# Patient Record
Sex: MALE | Race: WHITE | NOT HISPANIC OR LATINO
[De-identification: names, ages, dates, MRNs, and addresses within clinical notes are randomized per-mention and may not be internally consistent; named-entity substitution may affect disease eponyms.]

---

## 2017-05-16 ENCOUNTER — RX RENEWAL (OUTPATIENT)
Age: 57
End: 2017-05-16

## 2017-06-15 ENCOUNTER — RX RENEWAL (OUTPATIENT)
Age: 57
End: 2017-06-15

## 2018-09-06 ENCOUNTER — APPOINTMENT (OUTPATIENT)
Dept: ENDOCRINOLOGY | Facility: CLINIC | Age: 58
End: 2018-09-06
Payer: MEDICAID

## 2018-09-06 VITALS
OXYGEN SATURATION: 98 % | SYSTOLIC BLOOD PRESSURE: 130 MMHG | HEART RATE: 66 BPM | HEIGHT: 69.69 IN | BODY MASS INDEX: 36.05 KG/M2 | WEIGHT: 249 LBS | DIASTOLIC BLOOD PRESSURE: 70 MMHG

## 2018-09-06 DIAGNOSIS — K21.9 GASTRO-ESOPHAGEAL REFLUX DISEASE W/OUT ESOPHAGITIS: ICD-10-CM

## 2018-09-06 DIAGNOSIS — F32.9 MAJOR DEPRESSIVE DISORDER, SINGLE EPISODE, UNSPECIFIED: ICD-10-CM

## 2018-09-06 DIAGNOSIS — R01.1 CARDIAC MURMUR, UNSPECIFIED: ICD-10-CM

## 2018-09-06 LAB — GLUCOSE BLDC GLUCOMTR-MCNC: 103

## 2018-09-06 PROCEDURE — 99205 OFFICE O/P NEW HI 60 MIN: CPT | Mod: 25

## 2018-09-06 PROCEDURE — 82962 GLUCOSE BLOOD TEST: CPT

## 2018-10-11 ENCOUNTER — APPOINTMENT (OUTPATIENT)
Dept: ENDOCRINOLOGY | Facility: CLINIC | Age: 58
End: 2018-10-11
Payer: MEDICAID

## 2018-10-11 VITALS
BODY MASS INDEX: 35.65 KG/M2 | DIASTOLIC BLOOD PRESSURE: 81 MMHG | HEART RATE: 62 BPM | HEIGHT: 70 IN | WEIGHT: 249 LBS | SYSTOLIC BLOOD PRESSURE: 142 MMHG | OXYGEN SATURATION: 96 %

## 2018-10-11 LAB
GLUCOSE BLDC GLUCOMTR-MCNC: 103
HBA1C MFR BLD HPLC: 5.7

## 2018-10-11 PROCEDURE — 99213 OFFICE O/P EST LOW 20 MIN: CPT | Mod: 25

## 2018-10-11 PROCEDURE — 82962 GLUCOSE BLOOD TEST: CPT

## 2018-10-11 PROCEDURE — 36415 COLL VENOUS BLD VENIPUNCTURE: CPT

## 2018-10-11 RX ORDER — LIRAGLUTIDE 6 MG/ML
18 INJECTION SUBCUTANEOUS
Qty: 1 | Refills: 0 | Status: DISCONTINUED | COMMUNITY
Start: 2018-09-06 | End: 2018-10-11

## 2018-10-12 LAB — SAVE SPECIMEN: NORMAL

## 2018-10-15 LAB
APPEARANCE: CLEAR
BILIRUBIN URINE: NEGATIVE
BLOOD URINE: NEGATIVE
COLOR: YELLOW
CREAT SPEC-SCNC: 28 MG/DL
GLUCOSE QUALITATIVE U: NEGATIVE MG/DL
KETONES URINE: NEGATIVE
LEUKOCYTE ESTERASE URINE: NEGATIVE
MICROALBUMIN 24H UR DL<=1MG/L-MCNC: 3.3 MG/DL
MICROALBUMIN/CREAT 24H UR-RTO: 118 MG/G
NITRITE URINE: NEGATIVE
PH URINE: 6.5
PROTEIN URINE: NEGATIVE MG/DL
SPECIFIC GRAVITY URINE: 1.01
T4 FREE SERPL-MCNC: 1.1 NG/DL
T4 SERPL-MCNC: 6.3 UG/DL
TSH SERPL-ACNC: 3.27 UIU/ML
UROBILINOGEN URINE: NEGATIVE MG/DL

## 2018-11-16 ENCOUNTER — APPOINTMENT (OUTPATIENT)
Dept: ENDOCRINOLOGY | Facility: CLINIC | Age: 58
End: 2018-11-16

## 2018-11-30 ENCOUNTER — APPOINTMENT (OUTPATIENT)
Dept: ENDOCRINOLOGY | Facility: CLINIC | Age: 58
End: 2018-11-30
Payer: MEDICAID

## 2018-11-30 VITALS
OXYGEN SATURATION: 98 % | BODY MASS INDEX: 34.15 KG/M2 | HEART RATE: 74 BPM | DIASTOLIC BLOOD PRESSURE: 80 MMHG | WEIGHT: 238 LBS | SYSTOLIC BLOOD PRESSURE: 127 MMHG

## 2018-11-30 LAB — GLUCOSE BLDC GLUCOMTR-MCNC: 129

## 2018-11-30 PROCEDURE — 99214 OFFICE O/P EST MOD 30 MIN: CPT | Mod: 25

## 2018-11-30 PROCEDURE — 82962 GLUCOSE BLOOD TEST: CPT

## 2018-11-30 RX ORDER — FLUTICASONE PROPIONATE 50 MCG
50 SPRAY, SUSPENSION NASAL
Refills: 0 | Status: DISCONTINUED | COMMUNITY
End: 2018-11-30

## 2018-12-01 LAB
BASOPHILS # BLD AUTO: 0.03 K/UL
BASOPHILS NFR BLD AUTO: 0.4 %
EOSINOPHIL # BLD AUTO: 0.23 K/UL
EOSINOPHIL NFR BLD AUTO: 3.2 %
HCT VFR BLD CALC: 47.3 %
HGB BLD-MCNC: 16.5 G/DL
IMM GRANULOCYTES NFR BLD AUTO: 0.1 %
LYMPHOCYTES # BLD AUTO: 1.6 K/UL
LYMPHOCYTES NFR BLD AUTO: 22 %
MAN DIFF?: NORMAL
MCHC RBC-ENTMCNC: 28.5 PG
MCHC RBC-ENTMCNC: 34.9 GM/DL
MCV RBC AUTO: 81.7 FL
MONOCYTES # BLD AUTO: 0.92 K/UL
MONOCYTES NFR BLD AUTO: 12.7 %
NEUTROPHILS # BLD AUTO: 4.47 K/UL
NEUTROPHILS NFR BLD AUTO: 61.6 %
PLATELET # BLD AUTO: 264 K/UL
RBC # BLD: 5.79 M/UL
RBC # FLD: 17.1 %
WBC # FLD AUTO: 7.26 K/UL

## 2019-01-07 ENCOUNTER — RX RENEWAL (OUTPATIENT)
Age: 59
End: 2019-01-07

## 2019-01-15 ENCOUNTER — APPOINTMENT (OUTPATIENT)
Dept: ENDOCRINOLOGY | Facility: CLINIC | Age: 59
End: 2019-01-15
Payer: MEDICAID

## 2019-01-15 VITALS
HEART RATE: 58 BPM | DIASTOLIC BLOOD PRESSURE: 82 MMHG | OXYGEN SATURATION: 100 % | HEIGHT: 70 IN | WEIGHT: 252.25 LBS | SYSTOLIC BLOOD PRESSURE: 143 MMHG | BODY MASS INDEX: 36.11 KG/M2

## 2019-01-15 LAB
GLUCOSE BLDC GLUCOMTR-MCNC: 112
HBA1C MFR BLD HPLC: 5.5

## 2019-01-15 PROCEDURE — 99214 OFFICE O/P EST MOD 30 MIN: CPT | Mod: 25

## 2019-01-15 PROCEDURE — 83036 HEMOGLOBIN GLYCOSYLATED A1C: CPT | Mod: QW

## 2019-01-15 PROCEDURE — 82962 GLUCOSE BLOOD TEST: CPT

## 2019-01-15 RX ORDER — CHROMIUM 200 MCG
800 TABLET ORAL
Refills: 0 | Status: ACTIVE | COMMUNITY

## 2019-01-15 NOTE — ASSESSMENT
[FreeTextEntry1] : The patient was well controlled type 2 diabetes, his current A1c is 5.5. We did discuss about compliance with his medications and for now he will remain on Trulicity 0.75 ( although he was asking for an increase in his dose). Since he is back on aspirin because of the TIA he should discuss this with his gastroenterologist and be followed closely for any bleeding. [Carbohydrate Consistent Diet] : carbohydrate consistent diet [Diabetes Foot Care] : diabetes foot care [Importance of Diet and Exercise] : importance of diet and exercise to improve glycemic control, achieve weight loss and improve cardiovascular health [Self Monitoring of Blood Glucose] : self monitoring of blood glucose [Incretin Mimetic Therapy] : Risks and benefits of incretin mimetic therapy were discussed with the patient including nauseau, pancreatitis and potential risk of medullary thyroid cancer

## 2019-01-15 NOTE — PHYSICAL EXAM
[Alert] : alert [No Acute Distress] : no acute distress [Well Nourished] : well nourished [Well Developed] : well developed [Normal Sclera/Conjunctiva] : normal sclera/conjunctiva [EOMI] : extra ocular movement intact [No Proptosis] : no proptosis [Normal Oropharynx] : the oropharynx was normal [Thyroid Not Enlarged] : the thyroid was not enlarged [No Thyroid Nodules] : there were no palpable thyroid nodules [No Respiratory Distress] : no respiratory distress [No Accessory Muscle Use] : no accessory muscle use [Clear to Auscultation] : lungs were clear to auscultation bilaterally [Normal Rate] : heart rate was normal  [Normal S1, S2] : normal S1 and S2 [Regular Rhythm] : with a regular rhythm [Pedal Pulses Normal] : the pedal pulses are present [No Edema] : there was no peripheral edema [Normal Bowel Sounds] : normal bowel sounds [Not Tender] : non-tender [Soft] : abdomen soft [Not Distended] : not distended [No Spinal Tenderness] : no spinal tenderness [Spine Straight] : spine straight [Normal Gait] : normal gait [Normal Strength/Tone] : muscle strength and tone were normal [No Rash] : no rash [Acanthosis Nigricans] : no acanthosis nigricans [Normal Reflexes] : deep tendon reflexes were 2+ and symmetric [No Tremors] : no tremors [Oriented x3] : oriented to person, place, and time

## 2019-01-15 NOTE — HISTORY OF PRESENT ILLNESS
[FreeTextEntry1] : Patient with a history of type 2 diabetes for many years. Prior to bariatric surgery he used to be on insulin, after that he had lost about 100 pounds and was maintained on oral agents. Currently he is on metformin 2000 mg a day and Trulcity 0.75 weekly. She has missed a few doses of this however. Since his last visit he has gained some weight. He recently was told that he had a TIA and a few months ago he had a significant GI bleed. He is concerned about resuming aspirin. he has a h/o of hypothyroidism for many years; recent sonogram was normal.

## 2019-04-03 ENCOUNTER — RX RENEWAL (OUTPATIENT)
Age: 59
End: 2019-04-03

## 2019-04-16 ENCOUNTER — APPOINTMENT (OUTPATIENT)
Dept: ENDOCRINOLOGY | Facility: CLINIC | Age: 59
End: 2019-04-16

## 2019-07-16 ENCOUNTER — MEDICATION RENEWAL (OUTPATIENT)
Age: 59
End: 2019-07-16

## 2019-08-12 ENCOUNTER — RX RENEWAL (OUTPATIENT)
Age: 59
End: 2019-08-12

## 2019-08-27 ENCOUNTER — MEDICATION RENEWAL (OUTPATIENT)
Age: 59
End: 2019-08-27

## 2019-09-20 ENCOUNTER — MEDICATION RENEWAL (OUTPATIENT)
Age: 59
End: 2019-09-20

## 2019-09-20 ENCOUNTER — APPOINTMENT (OUTPATIENT)
Dept: ENDOCRINOLOGY | Facility: CLINIC | Age: 59
End: 2019-09-20
Payer: MEDICAID

## 2019-09-20 VITALS — SYSTOLIC BLOOD PRESSURE: 160 MMHG | HEART RATE: 65 BPM | DIASTOLIC BLOOD PRESSURE: 83 MMHG

## 2019-09-20 VITALS
DIASTOLIC BLOOD PRESSURE: 101 MMHG | HEIGHT: 70 IN | HEART RATE: 67 BPM | BODY MASS INDEX: 35.24 KG/M2 | SYSTOLIC BLOOD PRESSURE: 152 MMHG | WEIGHT: 246.13 LBS | OXYGEN SATURATION: 99 %

## 2019-09-20 LAB
GLUCOSE BLDC GLUCOMTR-MCNC: 113
HBA1C MFR BLD HPLC: 5.7

## 2019-09-20 PROCEDURE — 99214 OFFICE O/P EST MOD 30 MIN: CPT | Mod: 25

## 2019-09-20 PROCEDURE — 36415 COLL VENOUS BLD VENIPUNCTURE: CPT

## 2019-09-20 PROCEDURE — 83036 HEMOGLOBIN GLYCOSYLATED A1C: CPT | Mod: QW

## 2019-09-20 PROCEDURE — G0447 BEHAVIOR COUNSEL OBESITY 15M: CPT | Mod: 59

## 2019-09-20 PROCEDURE — 82962 GLUCOSE BLOOD TEST: CPT

## 2019-09-20 RX ORDER — CEPHALEXIN 500 MG/1
500 CAPSULE ORAL
Refills: 0 | Status: DISCONTINUED | COMMUNITY
End: 2019-09-20

## 2019-09-20 RX ORDER — CHLORHEXIDINE GLUCONATE 4 %
325 (65 FE) LIQUID (ML) TOPICAL
Refills: 0 | Status: DISCONTINUED | COMMUNITY
End: 2019-09-20

## 2019-09-20 RX ORDER — MUPIROCIN 20 MG/G
2 OINTMENT TOPICAL
Qty: 1 | Refills: 0 | Status: ACTIVE | COMMUNITY
Start: 2019-09-20 | End: 1900-01-01

## 2019-09-20 NOTE — HISTORY OF PRESENT ILLNESS
[FreeTextEntry1] : Patient with a long standing history of diabetes associated with obesity. He used to be on insulin as well. In 2001, he had gastric bypass surgery, and was able to remain off of the insulin. Since then he has only been taking Metformin 1000 mg BID. He did also take Byetta in the past after gastric bypass to help with controlling his appetite. He stopped taking it because he felt he no longer needed it. Presently he is on Trulicity 0.75 mg. He has lost 6lbs since his last visit 6 months ago. He is requesting to increase his dose. \par He also has a long standing history of hypothyroidism. Since he has been taking the medication the correct way, his TSH was wnl. \par He is requesting we check a CBC for him, as he has a history of anemia and is worried that it may have dropped down. He is not able to get to his PCP at this time. Denies any symptoms of anemia at this time. \par His BP is somewhat elevated today. He feels well and denies any chest pain, palpitations or shortness of breath. He does admit he had a lot of salty foods yesterday and today.

## 2019-09-20 NOTE — ASSESSMENT
[FreeTextEntry1] : Patient with well controlled type 2 diabetes associated with obesity. At this time, he will continue taking his medications as instructed. We discussed the importance of lifestyle modification in addition to the medication. I have encouraged him to cut down on the amount of diet snapple he drinks (currently drinking 1 gallon daily). We discussed incorporating exercise into his ADLs. \par Thyoid sono stable. Labs done to check TFTs as well. \par He will drink lots of water and contact his PCP for further instruction. \par  [Carbohydrate Consistent Diet] : carbohydrate consistent diet [Hypoglycemia Management] : hypoglycemia management [Diabetes Foot Care] : diabetes foot care [Long Term Vascular Complications] : long term vascular complications of diabetes [Importance of Diet and Exercise] : importance of diet and exercise to improve glycemic control, achieve weight loss and improve cardiovascular health [Self Monitoring of Blood Glucose] : self monitoring of blood glucose [Injection Technique, Storage, Sharps Disposal] : injection technique, storage, and sharps disposal [Levothyroxine] : The patient was instructed to take Levothyroxine on an empty stomach, separate from vitamins, and wait at least 30 minutes before eating [Retinopathy Screening] : Patient was referred to ophthalmology for retinopathy screening [Incretin Mimetic Therapy] : Risks and benefits of incretin mimetic therapy were discussed with the patient including nauseau, pancreatitis and potential risk of medullary thyroid cancer

## 2019-09-20 NOTE — PHYSICAL EXAM
[Alert] : alert [No Acute Distress] : no acute distress [Well Nourished] : well nourished [Well Developed] : well developed [Normal Sclera/Conjunctiva] : normal sclera/conjunctiva [EOMI] : extra ocular movement intact [No Proptosis] : no proptosis [Normal Oropharynx] : the oropharynx was normal [Thyroid Not Enlarged] : the thyroid was not enlarged [No Thyroid Nodules] : there were no palpable thyroid nodules [No Accessory Muscle Use] : no accessory muscle use [No Respiratory Distress] : no respiratory distress [Clear to Auscultation] : lungs were clear to auscultation bilaterally [Normal S1, S2] : normal S1 and S2 [Normal Rate] : heart rate was normal  [Regular Rhythm] : with a regular rhythm [Pedal Pulses Normal] : the pedal pulses are present [No Edema] : there was no peripheral edema [Normal Bowel Sounds] : normal bowel sounds [Soft] : abdomen soft [Not Tender] : non-tender [Spine Straight] : spine straight [Not Distended] : not distended [No Spinal Tenderness] : no spinal tenderness [No Stigmata of Cushings Syndrome] : no stigmata of cushings syndrome [Normal Gait] : normal gait [Normal Strength/Tone] : muscle strength and tone were normal [No Rash] : no rash [Normal Reflexes] : deep tendon reflexes were 2+ and symmetric [Acanthosis Nigricans] : no acanthosis nigricans [No Tremors] : no tremors [Oriented x3] : oriented to person, place, and time

## 2019-09-23 LAB
ALBUMIN SERPL ELPH-MCNC: 4.5 G/DL
ALP BLD-CCNC: 74 U/L
ALT SERPL-CCNC: 26 U/L
ANION GAP SERPL CALC-SCNC: 13 MMOL/L
APPEARANCE: CLEAR
AST SERPL-CCNC: 26 U/L
BASOPHILS # BLD AUTO: 0.04 K/UL
BASOPHILS NFR BLD AUTO: 0.6 %
BILIRUB SERPL-MCNC: 0.6 MG/DL
BILIRUBIN URINE: NEGATIVE
BLOOD URINE: NEGATIVE
BUN SERPL-MCNC: 10 MG/DL
CALCIUM SERPL-MCNC: 9.5 MG/DL
CHLORIDE SERPL-SCNC: 98 MMOL/L
CHOLEST SERPL-MCNC: 115 MG/DL
CHOLEST/HDLC SERPL: 1.9 RATIO
CO2 SERPL-SCNC: 23 MMOL/L
COLOR: YELLOW
CREAT SERPL-MCNC: 0.76 MG/DL
CREAT SPEC-SCNC: 24 MG/DL
EOSINOPHIL # BLD AUTO: 0.28 K/UL
EOSINOPHIL NFR BLD AUTO: 4 %
FERRITIN SERPL-MCNC: 17 NG/ML
FOLATE SERPL-MCNC: >20 NG/ML
GLUCOSE QUALITATIVE U: NEGATIVE
GLUCOSE SERPL-MCNC: 105 MG/DL
HCT VFR BLD CALC: 46 %
HDLC SERPL-MCNC: 62 MG/DL
HGB BLD-MCNC: 15.4 G/DL
IMM GRANULOCYTES NFR BLD AUTO: 0.1 %
IRON SATN MFR SERPL: 20 %
IRON SERPL-MCNC: 90 UG/DL
KETONES URINE: NEGATIVE
LDLC SERPL CALC-MCNC: 40 MG/DL
LEUKOCYTE ESTERASE URINE: NEGATIVE
LYMPHOCYTES # BLD AUTO: 1.86 K/UL
LYMPHOCYTES NFR BLD AUTO: 26.9 %
MAN DIFF?: NORMAL
MCHC RBC-ENTMCNC: 30.3 PG
MCHC RBC-ENTMCNC: 33.5 GM/DL
MCV RBC AUTO: 90.4 FL
MICROALBUMIN 24H UR DL<=1MG/L-MCNC: 7.3 MG/DL
MICROALBUMIN/CREAT 24H UR-RTO: 302 MG/G
MONOCYTES # BLD AUTO: 0.72 K/UL
MONOCYTES NFR BLD AUTO: 10.4 %
NEUTROPHILS # BLD AUTO: 4.01 K/UL
NEUTROPHILS NFR BLD AUTO: 58 %
NITRITE URINE: NEGATIVE
PH URINE: 7.5
PLATELET # BLD AUTO: 214 K/UL
POTASSIUM SERPL-SCNC: 4.4 MMOL/L
PROT SERPL-MCNC: 6.9 G/DL
PROTEIN URINE: NORMAL
RBC # BLD: 5.09 M/UL
RBC # FLD: 14.8 %
SAVE SPECIMEN: NORMAL
SODIUM SERPL-SCNC: 134 MMOL/L
SPECIFIC GRAVITY URINE: 1.01
T4 FREE SERPL-MCNC: 1.2 NG/DL
T4 SERPL-MCNC: 6.9 UG/DL
TIBC SERPL-MCNC: 446 UG/DL
TRIGL SERPL-MCNC: 67 MG/DL
TSH SERPL-ACNC: 4.41 UIU/ML
UIBC SERPL-MCNC: 356 UG/DL
UROBILINOGEN URINE: NORMAL
VIT B12 SERPL-MCNC: 707 PG/ML
WBC # FLD AUTO: 6.92 K/UL

## 2019-11-11 ENCOUNTER — APPOINTMENT (OUTPATIENT)
Dept: HEART AND VASCULAR | Facility: CLINIC | Age: 59
End: 2019-11-11

## 2020-01-07 ENCOUNTER — APPOINTMENT (OUTPATIENT)
Dept: ENDOCRINOLOGY | Facility: CLINIC | Age: 60
End: 2020-01-07
Payer: MEDICAID

## 2020-01-07 VITALS
BODY MASS INDEX: 34.65 KG/M2 | OXYGEN SATURATION: 100 % | HEART RATE: 55 BPM | WEIGHT: 242 LBS | SYSTOLIC BLOOD PRESSURE: 113 MMHG | HEIGHT: 70 IN | DIASTOLIC BLOOD PRESSURE: 62 MMHG

## 2020-01-07 LAB
GLUCOSE BLDC GLUCOMTR-MCNC: 155
HBA1C MFR BLD HPLC: 5.4

## 2020-01-07 PROCEDURE — 83036 HEMOGLOBIN GLYCOSYLATED A1C: CPT | Mod: QW

## 2020-01-07 PROCEDURE — 82962 GLUCOSE BLOOD TEST: CPT

## 2020-01-07 PROCEDURE — 99214 OFFICE O/P EST MOD 30 MIN: CPT | Mod: 25

## 2020-01-07 PROCEDURE — G0447 BEHAVIOR COUNSEL OBESITY 15M: CPT | Mod: 59

## 2020-01-07 NOTE — HISTORY OF PRESENT ILLNESS
[FreeTextEntry1] : Patient with a long standing history of diabetes associated with obesity. He used to be on insulin as well. In 2001, he had gastric bypass surgery, and was able to remain off of the insulin. Since then he has only been taking Metformin 1000 mg BID. He did also take Byetta in the past after gastric bypass to help with controlling his appetite. He stopped taking it because he felt he no longer needed it. Presently he is on Trulicity 0.75 mg and Metformin 1000 mg BID. He has lost 4lbs since his last visit 4 months ago. \par He also has a long standing history of hypothyroidism. Last visit tsh was mildly elevated. Pt was taking it with snapple. He has since been taking it correctly. A1C is 5.4, down from 5.7.\par His BP is much improved. He s/w his cardiologist per my recommendation, and his BP medication was adjusted. He feels well on this.

## 2020-01-07 NOTE — PHYSICAL EXAM
[No Acute Distress] : no acute distress [Alert] : alert [Well Developed] : well developed [Well Nourished] : well nourished [Normal Sclera/Conjunctiva] : normal sclera/conjunctiva [EOMI] : extra ocular movement intact [No Proptosis] : no proptosis [Normal Oropharynx] : the oropharynx was normal [Thyroid Not Enlarged] : the thyroid was not enlarged [No Thyroid Nodules] : there were no palpable thyroid nodules [No Respiratory Distress] : no respiratory distress [No Accessory Muscle Use] : no accessory muscle use [Clear to Auscultation] : lungs were clear to auscultation bilaterally [Normal Rate] : heart rate was normal  [Normal S1, S2] : normal S1 and S2 [Regular Rhythm] : with a regular rhythm [Pedal Pulses Normal] : the pedal pulses are present [No Edema] : there was no peripheral edema [Normal Bowel Sounds] : normal bowel sounds [Not Tender] : non-tender [Soft] : abdomen soft [Not Distended] : not distended [No Spinal Tenderness] : no spinal tenderness [Spine Straight] : spine straight [No Stigmata of Cushings Syndrome] : no stigmata of cushings syndrome [Normal Gait] : normal gait [Normal Strength/Tone] : muscle strength and tone were normal [No Rash] : no rash [Normal Reflexes] : deep tendon reflexes were 2+ and symmetric [No Tremors] : no tremors [Oriented x3] : oriented to person, place, and time [Acanthosis Nigricans] : no acanthosis nigricans

## 2020-01-14 LAB
ALBUMIN SERPL ELPH-MCNC: 4.8 G/DL
ALP BLD-CCNC: 72 U/L
ALT SERPL-CCNC: 24 U/L
ANION GAP SERPL CALC-SCNC: 11 MMOL/L
APPEARANCE: CLEAR
AST SERPL-CCNC: 28 U/L
BASOPHILS # BLD AUTO: 0.07 K/UL
BASOPHILS NFR BLD AUTO: 1 %
BILIRUB SERPL-MCNC: 0.7 MG/DL
BILIRUBIN URINE: NEGATIVE
BLOOD URINE: NEGATIVE
BUN SERPL-MCNC: 10 MG/DL
CALCIUM SERPL-MCNC: 10 MG/DL
CHLORIDE SERPL-SCNC: 90 MMOL/L
CHOLEST SERPL-MCNC: 121 MG/DL
CHOLEST/HDLC SERPL: 1.8 RATIO
CO2 SERPL-SCNC: 27 MMOL/L
COLOR: NORMAL
CREAT SERPL-MCNC: 0.93 MG/DL
CREAT SPEC-SCNC: 32 MG/DL
EOSINOPHIL # BLD AUTO: 0.35 K/UL
EOSINOPHIL NFR BLD AUTO: 4.8 %
GLUCOSE QUALITATIVE U: NEGATIVE
GLUCOSE SERPL-MCNC: 102 MG/DL
HCT VFR BLD CALC: 43.7 %
HDLC SERPL-MCNC: 67 MG/DL
HGB BLD-MCNC: 15.3 G/DL
IMM GRANULOCYTES NFR BLD AUTO: 0.1 %
KETONES URINE: NEGATIVE
LDLC SERPL CALC-MCNC: 38 MG/DL
LEUKOCYTE ESTERASE URINE: NEGATIVE
LYMPHOCYTES # BLD AUTO: 2.23 K/UL
LYMPHOCYTES NFR BLD AUTO: 30.3 %
MAN DIFF?: NORMAL
MCHC RBC-ENTMCNC: 31 PG
MCHC RBC-ENTMCNC: 35 GM/DL
MCV RBC AUTO: 88.5 FL
MICROALBUMIN 24H UR DL<=1MG/L-MCNC: 4.1 MG/DL
MICROALBUMIN/CREAT 24H UR-RTO: 127 MG/G
MONOCYTES # BLD AUTO: 0.94 K/UL
MONOCYTES NFR BLD AUTO: 12.8 %
NEUTROPHILS # BLD AUTO: 3.75 K/UL
NEUTROPHILS NFR BLD AUTO: 51 %
NITRITE URINE: NEGATIVE
PH URINE: 7.5
PLATELET # BLD AUTO: 253 K/UL
POTASSIUM SERPL-SCNC: 5.3 MMOL/L
PROT SERPL-MCNC: 7.1 G/DL
PROTEIN URINE: NEGATIVE
RBC # BLD: 4.94 M/UL
RBC # FLD: 15 %
SODIUM SERPL-SCNC: 128 MMOL/L
SPECIFIC GRAVITY URINE: 1.01
T4 FREE SERPL-MCNC: 1.3 NG/DL
T4 SERPL-MCNC: 7.5 UG/DL
TRIGL SERPL-MCNC: 79 MG/DL
TSH SERPL-ACNC: 6.24 UIU/ML
UROBILINOGEN URINE: NORMAL
WBC # FLD AUTO: 7.35 K/UL

## 2020-02-20 LAB
ALBUMIN SERPL ELPH-MCNC: 4.9 G/DL
ALP BLD-CCNC: 68 U/L
ALT SERPL-CCNC: 21 U/L
ANION GAP SERPL CALC-SCNC: 14 MMOL/L
APPEARANCE: CLEAR
AST SERPL-CCNC: 29 U/L
BACTERIA: NEGATIVE
BILIRUB SERPL-MCNC: 0.7 MG/DL
BILIRUBIN URINE: NEGATIVE
BLOOD URINE: NEGATIVE
BUN SERPL-MCNC: 13 MG/DL
CALCIUM SERPL-MCNC: 9.7 MG/DL
CHLORIDE SERPL-SCNC: 95 MMOL/L
CO2 SERPL-SCNC: 22 MMOL/L
COLOR: YELLOW
CREAT SERPL-MCNC: 0.85 MG/DL
GLUCOSE QUALITATIVE U: NEGATIVE
GLUCOSE SERPL-MCNC: 95 MG/DL
HYALINE CASTS: 2 /LPF
KETONES URINE: NEGATIVE
LEUKOCYTE ESTERASE URINE: NEGATIVE
MICROSCOPIC-UA: NORMAL
NITRITE URINE: NEGATIVE
OSMOLALITY SERPL: 273 MOSMOL/KG
OSMOLALITY UR: 324 MOSM/KG
PH URINE: 8
POTASSIUM SERPL-SCNC: 5 MMOL/L
PROT SERPL-MCNC: 7.1 G/DL
PROTEIN URINE: NORMAL
RED BLOOD CELLS URINE: 4 /HPF
SODIUM SERPL-SCNC: 131 MMOL/L
SPECIFIC GRAVITY URINE: 1.01
SQUAMOUS EPITHELIAL CELLS: 0 /HPF
T4 FREE SERPL-MCNC: 1.3 NG/DL
T4 SERPL-MCNC: 7.4 UG/DL
TSH SERPL-ACNC: 3.47 UIU/ML
UROBILINOGEN URINE: NORMAL
WHITE BLOOD CELLS URINE: 1 /HPF

## 2020-02-25 ENCOUNTER — NON-APPOINTMENT (OUTPATIENT)
Age: 60
End: 2020-02-25

## 2020-04-07 ENCOUNTER — LABORATORY RESULT (OUTPATIENT)
Age: 60
End: 2020-04-07

## 2020-04-08 LAB
25(OH)D3 SERPL-MCNC: 43.8 NG/ML
ALBUMIN SERPL ELPH-MCNC: 4.7 G/DL
ALP BLD-CCNC: 77 U/L
ALT SERPL-CCNC: 20 U/L
ANION GAP SERPL CALC-SCNC: 14 MMOL/L
APPEARANCE: CLEAR
AST SERPL-CCNC: 30 U/L
BASOPHILS # BLD AUTO: 0.09 K/UL
BASOPHILS NFR BLD AUTO: 1.2 %
BILIRUB SERPL-MCNC: 0.9 MG/DL
BILIRUBIN URINE: NEGATIVE
BLOOD URINE: NEGATIVE
BUN SERPL-MCNC: 5 MG/DL
CALCIUM SERPL-MCNC: 9.5 MG/DL
CHLORIDE SERPL-SCNC: 89 MMOL/L
CHOLEST SERPL-MCNC: 125 MG/DL
CHOLEST/HDLC SERPL: 1.9 RATIO
CO2 SERPL-SCNC: 24 MMOL/L
COLOR: NORMAL
CREAT SERPL-MCNC: 0.8 MG/DL
CREAT SPEC-SCNC: 45 MG/DL
EOSINOPHIL # BLD AUTO: 0.38 K/UL
EOSINOPHIL NFR BLD AUTO: 5.1 %
ESTIMATED AVERAGE GLUCOSE: 108 MG/DL
FOLATE SERPL-MCNC: >20 NG/ML
GLUCOSE QUALITATIVE U: NEGATIVE
GLUCOSE SERPL-MCNC: 89 MG/DL
HBA1C MFR BLD HPLC: 5.4 %
HCT VFR BLD CALC: 46.1 %
HDLC SERPL-MCNC: 67 MG/DL
HGB BLD-MCNC: 15.2 G/DL
IMM GRANULOCYTES NFR BLD AUTO: 0.1 %
KETONES URINE: NEGATIVE
LDLC SERPL CALC-MCNC: 41 MG/DL
LEUKOCYTE ESTERASE URINE: NEGATIVE
LYMPHOCYTES # BLD AUTO: 1.77 K/UL
LYMPHOCYTES NFR BLD AUTO: 23.7 %
MAN DIFF?: NORMAL
MCHC RBC-ENTMCNC: 28.7 PG
MCHC RBC-ENTMCNC: 33 GM/DL
MCV RBC AUTO: 87 FL
MICROALBUMIN 24H UR DL<=1MG/L-MCNC: 14.9 MG/DL
MICROALBUMIN/CREAT 24H UR-RTO: 329 MG/G
MONOCYTES # BLD AUTO: 0.84 K/UL
MONOCYTES NFR BLD AUTO: 11.2 %
NEUTROPHILS # BLD AUTO: 4.39 K/UL
NEUTROPHILS NFR BLD AUTO: 58.7 %
NITRITE URINE: NEGATIVE
PH URINE: 8.5
PLATELET # BLD AUTO: 263 K/UL
POTASSIUM SERPL-SCNC: 4.5 MMOL/L
PROT SERPL-MCNC: 6.9 G/DL
PROTEIN URINE: ABNORMAL
RBC # BLD: 5.3 M/UL
RBC # FLD: 13.9 %
SAVE SPECIMEN: NORMAL
SODIUM SERPL-SCNC: 127 MMOL/L
SPECIFIC GRAVITY URINE: 1.01
T4 FREE SERPL-MCNC: 1.4 NG/DL
T4 SERPL-MCNC: 8.1 UG/DL
TRIGL SERPL-MCNC: 81 MG/DL
TSH SERPL-ACNC: 4.08 UIU/ML
UROBILINOGEN URINE: NORMAL
VIT B12 SERPL-MCNC: 1071 PG/ML
WBC # FLD AUTO: 7.48 K/UL

## 2020-04-15 LAB
ALBUMIN SERPL ELPH-MCNC: 4.9 G/DL
ALP BLD-CCNC: 86 U/L
ALT SERPL-CCNC: 26 U/L
ANION GAP SERPL CALC-SCNC: 11 MMOL/L
AST SERPL-CCNC: 25 U/L
BILIRUB SERPL-MCNC: 0.6 MG/DL
BUN SERPL-MCNC: 14 MG/DL
CALCIUM SERPL-MCNC: 9.9 MG/DL
CHLORIDE SERPL-SCNC: 94 MMOL/L
CO2 SERPL-SCNC: 27 MMOL/L
CORTIS SERPL-MCNC: 10.3 UG/DL
CREAT SERPL-MCNC: 0.89 MG/DL
GLUCOSE SERPL-MCNC: 98 MG/DL
OSMOLALITY SERPL: 282 MOSMOL/KG
OSMOLALITY UR: 290 MOSM/KG
POTASSIUM SERPL-SCNC: 5 MMOL/L
PROLACTIN SERPL-MCNC: 14.3 NG/ML
PROT SERPL-MCNC: 7.5 G/DL
SAVE SPECIMEN: NORMAL
SODIUM ?TM SUB UR QN: 32 MMOL/L
SODIUM SERPL-SCNC: 133 MMOL/L
TSH SERPL-ACNC: 4.21 UIU/ML

## 2020-04-16 ENCOUNTER — NON-APPOINTMENT (OUTPATIENT)
Age: 60
End: 2020-04-16

## 2020-04-17 LAB — CHLORIDE ?TM UR-SCNC: 31 MMOL/L

## 2020-04-27 RX ORDER — BLOOD SUGAR DIAGNOSTIC
STRIP MISCELLANEOUS 3 TIMES DAILY
Qty: 1 | Refills: 5 | Status: ACTIVE | COMMUNITY
Start: 2018-09-06 | End: 1900-01-01

## 2020-06-22 ENCOUNTER — RX RENEWAL (OUTPATIENT)
Age: 60
End: 2020-06-22

## 2020-07-06 ENCOUNTER — APPOINTMENT (OUTPATIENT)
Dept: ENDOCRINOLOGY | Facility: CLINIC | Age: 60
End: 2020-07-06
Payer: MEDICAID

## 2020-07-06 VITALS
WEIGHT: 255.25 LBS | SYSTOLIC BLOOD PRESSURE: 145 MMHG | HEIGHT: 70 IN | DIASTOLIC BLOOD PRESSURE: 69 MMHG | HEART RATE: 55 BPM | BODY MASS INDEX: 36.54 KG/M2 | OXYGEN SATURATION: 95 %

## 2020-07-06 LAB
GLUCOSE BLDC GLUCOMTR-MCNC: 94
HBA1C MFR BLD HPLC: 5.7

## 2020-07-06 PROCEDURE — 99214 OFFICE O/P EST MOD 30 MIN: CPT | Mod: 25

## 2020-07-06 PROCEDURE — 83036 HEMOGLOBIN GLYCOSYLATED A1C: CPT | Mod: QW

## 2020-07-06 PROCEDURE — 36415 COLL VENOUS BLD VENIPUNCTURE: CPT

## 2020-07-06 PROCEDURE — 82962 GLUCOSE BLOOD TEST: CPT

## 2020-07-06 RX ORDER — IBUPROFEN 600 MG/1
600 TABLET, FILM COATED ORAL
Qty: 30 | Refills: 0 | Status: ACTIVE | COMMUNITY
Start: 2020-06-11

## 2020-07-06 RX ORDER — AZITHROMYCIN 500 MG/1
500 TABLET, FILM COATED ORAL
Qty: 3 | Refills: 0 | Status: ACTIVE | COMMUNITY
Start: 2020-04-03

## 2020-07-06 RX ORDER — AMLODIPINE BESYLATE 2.5 MG/1
2.5 TABLET ORAL
Qty: 90 | Refills: 0 | Status: ACTIVE | COMMUNITY
Start: 2020-03-23

## 2020-07-06 RX ORDER — ESCITALOPRAM OXALATE 10 MG/1
10 TABLET ORAL
Qty: 90 | Refills: 0 | Status: ACTIVE | COMMUNITY
Start: 2020-04-27

## 2020-07-06 RX ORDER — ACETAMINOPHEN AND CODEINE 300; 30 MG/1; MG/1
300-30 TABLET ORAL
Qty: 10 | Refills: 0 | Status: ACTIVE | COMMUNITY
Start: 2020-06-29

## 2020-07-06 RX ORDER — AZITHROMYCIN 250 MG/1
250 TABLET, FILM COATED ORAL
Qty: 6 | Refills: 0 | Status: ACTIVE | COMMUNITY
Start: 2020-05-26

## 2020-07-06 RX ORDER — LOSARTAN POTASSIUM 50 MG/1
50 TABLET, FILM COATED ORAL DAILY
Refills: 0 | Status: ACTIVE | COMMUNITY

## 2020-07-06 RX ORDER — HYDROXYCHLOROQUINE SULFATE 200 MG/1
200 TABLET, FILM COATED ORAL
Qty: 28 | Refills: 0 | Status: ACTIVE | COMMUNITY
Start: 2020-04-03

## 2020-07-06 RX ORDER — LABETALOL HYDROCHLORIDE 200 MG/1
200 TABLET, FILM COATED ORAL
Qty: 180 | Refills: 0 | Status: ACTIVE | COMMUNITY
Start: 2020-04-27

## 2020-07-06 RX ORDER — CLONAZEPAM 1 MG/1
1 TABLET ORAL
Qty: 90 | Refills: 0 | Status: ACTIVE | COMMUNITY
Start: 2020-06-29

## 2020-07-06 RX ORDER — PANTOPRAZOLE 40 MG/1
40 TABLET, DELAYED RELEASE ORAL
Refills: 0 | Status: DISCONTINUED | COMMUNITY
End: 2020-07-06

## 2020-07-06 RX ORDER — AMOXICILLIN 875 MG/1
875 TABLET, FILM COATED ORAL
Qty: 20 | Refills: 0 | Status: ACTIVE | COMMUNITY
Start: 2020-06-11

## 2020-07-06 RX ORDER — NEBIVOLOL HYDROCHLORIDE 20 MG/1
20 TABLET ORAL DAILY
Refills: 0 | Status: ACTIVE | COMMUNITY

## 2020-07-06 RX ORDER — BETAMETHASONE DIPROPIONATE 0.5 MG/G
0.05 OINTMENT TOPICAL
Qty: 15 | Refills: 0 | Status: ACTIVE | COMMUNITY
Start: 2019-12-02

## 2020-07-06 NOTE — PHYSICAL EXAM
[Alert] : alert [Well Nourished] : well nourished [No Acute Distress] : no acute distress [Well Developed] : well developed [EOMI] : extra ocular movement intact [Normal Sclera/Conjunctiva] : normal sclera/conjunctiva [No Proptosis] : no proptosis [Thyroid Not Enlarged] : the thyroid was not enlarged [Normal Oropharynx] : the oropharynx was normal [No Thyroid Nodules] : no palpable thyroid nodules [No Respiratory Distress] : no respiratory distress [No Accessory Muscle Use] : no accessory muscle use [Clear to Auscultation] : lungs were clear to auscultation bilaterally [Normal Rate] : heart rate was normal [Normal S1, S2] : normal S1 and S2 [Regular Rhythm] : with a regular rhythm [Pedal Pulses Normal] : the pedal pulses are present [No Edema] : no peripheral edema [Normal Bowel Sounds] : normal bowel sounds [Not Tender] : non-tender [Not Distended] : not distended [Soft] : abdomen soft [No Stigmata of Cushings Syndrome] : no stigmata of Cushings Syndrome [Normal Gait] : normal gait [No Rash] : no rash [Normal Strength/Tone] : muscle strength and tone were normal [No Sensory Deficits] : the sensory exam was normal to light touch and pinprick [Normal Reflexes] : deep tendon reflexes were 2+ and symmetric [No Tremors] : no tremors [Normal Sensation on Monofilament Testing] : normal sensation on monofilament testing of lower extremities [Oriented x3] : oriented to person, place, and time [Acanthosis Nigricans] : no acanthosis nigricans

## 2020-07-06 NOTE — HISTORY OF PRESENT ILLNESS
[FreeTextEntry1] : Patient with a history of type 2 diabetes for many years. Prior to bariatric surgery he used to be on insulin, after that he had lost about 100 pounds and was maintained on oral agents. Currently he is on metformin 2000 mg a day and Trulicity 0.75 weekly.  He has had a enedina gain and would like to increase to 1.5 mg dose.  . Since his last visit he has gained some weight. He recently was told that he had a TIA and a few months ago he had a significant GI bleed. He is concerned about resuming aspirin. he has a h/o of hypothyroidism for many years; thyroid  sonogram was normal. He drink a large amount of fluid daily and at times sodium levels have been in 120's.

## 2020-07-06 NOTE — ASSESSMENT
[FreeTextEntry1] : The patient was well controlled type 2 diabetes, his current A1c is 5.7. We did discuss about compliance with his medications; due to weight gain can increase Trulicity  to 1.5 mg weekly. Since he is back on aspirin because of the TIA he should discuss this with his gastroenterologist and be followed closely for any bleeding.Labs done today to also assess sodium level. Told to decrease his intake of fluids by one snapple bottle a day.  [Long Term Vascular Complications] : long term vascular complications of diabetes [Importance of Diet and Exercise] : importance of diet and exercise to improve glycemic control, achieve weight loss and improve cardiovascular health [Carbohydrate Consistent Diet] : carbohydrate consistent diet [Weight Loss] : weight loss [Self Monitoring of Blood Glucose] : self monitoring of blood glucose [Retinopathy Screening] : Patient was referred to ophthalmology for retinopathy screening [Diabetic Medications] : Risks and benefits of diabetic medications were discussed

## 2020-07-08 LAB
25(OH)D3 SERPL-MCNC: 42 NG/ML
ALBUMIN SERPL ELPH-MCNC: 4.8 G/DL
ALP BLD-CCNC: 86 U/L
ALT SERPL-CCNC: 25 U/L
ANION GAP SERPL CALC-SCNC: 14 MMOL/L
APPEARANCE: CLEAR
AST SERPL-CCNC: 26 U/L
BASOPHILS # BLD AUTO: 0.06 K/UL
BASOPHILS NFR BLD AUTO: 0.8 %
BILIRUB SERPL-MCNC: 0.5 MG/DL
BILIRUBIN URINE: NEGATIVE
BLOOD URINE: NEGATIVE
BUN SERPL-MCNC: 9 MG/DL
CALCIUM SERPL-MCNC: 9.1 MG/DL
CHLORIDE SERPL-SCNC: 93 MMOL/L
CHOLEST SERPL-MCNC: 112 MG/DL
CHOLEST/HDLC SERPL: 2 RATIO
CO2 SERPL-SCNC: 23 MMOL/L
COLOR: NORMAL
CREAT SERPL-MCNC: 0.8 MG/DL
CREAT SPEC-SCNC: 32 MG/DL
EOSINOPHIL # BLD AUTO: 0.29 K/UL
EOSINOPHIL NFR BLD AUTO: 4 %
GLUCOSE QUALITATIVE U: NEGATIVE
GLUCOSE SERPL-MCNC: 88 MG/DL
HCT VFR BLD CALC: 43.1 %
HDLC SERPL-MCNC: 57 MG/DL
HGB BLD-MCNC: 14.1 G/DL
IMM GRANULOCYTES NFR BLD AUTO: 0.1 %
KETONES URINE: NEGATIVE
LDLC SERPL CALC-MCNC: 39 MG/DL
LEUKOCYTE ESTERASE URINE: NEGATIVE
LYMPHOCYTES # BLD AUTO: 1.91 K/UL
LYMPHOCYTES NFR BLD AUTO: 26.6 %
MAN DIFF?: NORMAL
MCHC RBC-ENTMCNC: 27.2 PG
MCHC RBC-ENTMCNC: 32.7 GM/DL
MCV RBC AUTO: 83 FL
MICROALBUMIN 24H UR DL<=1MG/L-MCNC: 5.8 MG/DL
MICROALBUMIN/CREAT 24H UR-RTO: 181 MG/G
MONOCYTES # BLD AUTO: 0.8 K/UL
MONOCYTES NFR BLD AUTO: 11.2 %
NEUTROPHILS # BLD AUTO: 4.1 K/UL
NEUTROPHILS NFR BLD AUTO: 57.3 %
NITRITE URINE: NEGATIVE
OSMOLALITY SERPL: 267 MOSMOL/KG
OSMOLALITY UR: 281 MOSM/KG
PH URINE: 8
PLATELET # BLD AUTO: 243 K/UL
POTASSIUM SERPL-SCNC: 5 MMOL/L
PROT SERPL-MCNC: 7.1 G/DL
PROTEIN URINE: NORMAL
RBC # BLD: 5.19 M/UL
RBC # FLD: 15 %
SAVE SPECIMEN: NORMAL
SODIUM ?TM SUB UR QN: 30 MMOL/L
SODIUM SERPL-SCNC: 129 MMOL/L
SPECIFIC GRAVITY URINE: 1.01
T4 FREE SERPL-MCNC: 1.4 NG/DL
T4 SERPL-MCNC: 7 UG/DL
TRIGL SERPL-MCNC: 80 MG/DL
TSH SERPL-ACNC: 5.58 UIU/ML
UROBILINOGEN URINE: NORMAL
WBC # FLD AUTO: 7.17 K/UL

## 2020-07-16 LAB
SAVE SPECIMEN: NORMAL
SODIUM SERPL-SCNC: 134 MMOL/L

## 2020-08-17 ENCOUNTER — RX RENEWAL (OUTPATIENT)
Age: 60
End: 2020-08-17

## 2020-08-26 ENCOUNTER — APPOINTMENT (OUTPATIENT)
Dept: ENDOCRINOLOGY | Facility: CLINIC | Age: 60
End: 2020-08-26
Payer: MEDICAID

## 2020-08-26 VITALS
HEART RATE: 52 BPM | WEIGHT: 244 LBS | OXYGEN SATURATION: 98 % | DIASTOLIC BLOOD PRESSURE: 75 MMHG | BODY MASS INDEX: 34.93 KG/M2 | HEIGHT: 70 IN | SYSTOLIC BLOOD PRESSURE: 153 MMHG

## 2020-08-26 LAB — GLUCOSE BLDC GLUCOMTR-MCNC: 89

## 2020-08-26 PROCEDURE — 36415 COLL VENOUS BLD VENIPUNCTURE: CPT

## 2020-08-26 PROCEDURE — 82962 GLUCOSE BLOOD TEST: CPT

## 2020-08-26 PROCEDURE — 99213 OFFICE O/P EST LOW 20 MIN: CPT | Mod: 25

## 2020-08-26 RX ORDER — AMLODIPINE BESYLATE 5 MG/1
5 TABLET ORAL DAILY
Refills: 0 | Status: ACTIVE | COMMUNITY
Start: 2020-01-23

## 2020-08-26 NOTE — PHYSICAL EXAM
[Well Nourished] : well nourished [Alert] : alert [Well Developed] : well developed [No Acute Distress] : no acute distress [EOMI] : extra ocular movement intact [Normal Sclera/Conjunctiva] : normal sclera/conjunctiva [Normal Oropharynx] : the oropharynx was normal [No Proptosis] : no proptosis [Thyroid Not Enlarged] : the thyroid was not enlarged [No Thyroid Nodules] : no palpable thyroid nodules [No Respiratory Distress] : no respiratory distress [Clear to Auscultation] : lungs were clear to auscultation bilaterally [No Accessory Muscle Use] : no accessory muscle use [Normal S1, S2] : normal S1 and S2 [Regular Rhythm] : with a regular rhythm [Normal Rate] : heart rate was normal [No Edema] : no peripheral edema [Pedal Pulses Normal] : the pedal pulses are present [Not Distended] : not distended [Not Tender] : non-tender [Normal Bowel Sounds] : normal bowel sounds [Soft] : abdomen soft [Normal Anterior Cervical Nodes] : no anterior cervical lymphadenopathy [Normal Posterior Cervical Nodes] : no posterior cervical lymphadenopathy [No Spinal Tenderness] : no spinal tenderness [Spine Straight] : spine straight [Normal Gait] : normal gait [Normal Strength/Tone] : muscle strength and tone were normal [No Rash] : no rash [Acanthosis Nigricans] : no acanthosis nigricans [Normal Reflexes] : deep tendon reflexes were 2+ and symmetric [No Tremors] : no tremors [Oriented x3] : oriented to person, place, and time

## 2020-08-26 NOTE — HISTORY OF PRESENT ILLNESS
[FreeTextEntry1] : Patient with a long standing history of diabetes associated with obesity. He used to be on insulin as well. In 2001, he had gastric bypass surgery, and was able to remain off of the insulin. Since then he has only been taking Metformin 1000 mg BID. He did also take Byetta in the past after gastric bypass to help with controlling his appetite. He stopped taking it because he felt he no longer needed it. Presently he is on Trulicity 1.5 mg (increased at his last visit) and Metformin 1000 mg BID. He has lost 11lbs since his last visit 4 months ago. \par He also has a long standing history of hypothyroidism. Last visit tsh was mildly elevated. Pt was taking it with snapple. He has since been taking it correctly. A1C is 5.4, down from 5.7.\par His BP is much improved. He s/w his cardiologist per my recommendation, and his BP medication was adjusted. He feels well on this. \par He has cut down his fluid intake.

## 2020-08-26 NOTE — ASSESSMENT
[FreeTextEntry1] : Patient with well controlled type 2 diabetes associated with obesity. He will continue to focus on weight loss. I did stress the importance of cutting down his snapple intake further. I explained to him that even though it is diet snapple not only is it impacting his sodium but also his insulin resistance. He will continue his medications. \par We discussed ways he can incorporate exercise into his daily routine. \par Thyoid sono stable 12/2018. Will continue to monitor TFTs\par \par Labs done to check for sodium level.\par \par f/u in 2 months\par  [Long Term Vascular Complications] : long term vascular complications of diabetes [Carbohydrate Consistent Diet] : carbohydrate consistent diet [Exercise/Effect on Glucose] : exercise/effect on glucose [Importance of Diet and Exercise] : importance of diet and exercise to improve glycemic control, achieve weight loss and improve cardiovascular health [Self Monitoring of Blood Glucose] : self monitoring of blood glucose [Injection Technique, Storage, Sharps Disposal] : injection technique, storage, and sharps disposal [Retinopathy Screening] : Patient was referred to ophthalmology for retinopathy screening [Weight Loss] : weight loss [Diabetic Medications] : Risks and benefits of diabetic medications were discussed

## 2020-08-27 LAB
ALBUMIN SERPL ELPH-MCNC: 4.8 G/DL
ALP BLD-CCNC: 86 U/L
ALT SERPL-CCNC: 30 U/L
ANION GAP SERPL CALC-SCNC: 15 MMOL/L
AST SERPL-CCNC: 30 U/L
BILIRUB SERPL-MCNC: 0.6 MG/DL
BUN SERPL-MCNC: 10 MG/DL
CALCIUM SERPL-MCNC: 9.8 MG/DL
CHLORIDE SERPL-SCNC: 93 MMOL/L
CO2 SERPL-SCNC: 22 MMOL/L
CREAT SERPL-MCNC: 0.8 MG/DL
GLUCOSE SERPL-MCNC: 82 MG/DL
POTASSIUM SERPL-SCNC: 4.8 MMOL/L
PROT SERPL-MCNC: 6.9 G/DL
SAVE SPECIMEN: NORMAL
SODIUM SERPL-SCNC: 130 MMOL/L

## 2020-09-17 ENCOUNTER — RX RENEWAL (OUTPATIENT)
Age: 60
End: 2020-09-17

## 2020-10-13 ENCOUNTER — APPOINTMENT (OUTPATIENT)
Dept: ENDOCRINOLOGY | Facility: CLINIC | Age: 60
End: 2020-10-13
Payer: MEDICAID

## 2020-10-13 VITALS
HEART RATE: 49 BPM | SYSTOLIC BLOOD PRESSURE: 150 MMHG | HEIGHT: 70 IN | BODY MASS INDEX: 34.72 KG/M2 | DIASTOLIC BLOOD PRESSURE: 78 MMHG | OXYGEN SATURATION: 100 % | WEIGHT: 242.5 LBS

## 2020-10-13 DIAGNOSIS — Z20.828 CONTACT WITH AND (SUSPECTED) EXPOSURE TO OTHER VIRAL COMMUNICABLE DISEASES: ICD-10-CM

## 2020-10-13 LAB
GLUCOSE BLDC GLUCOMTR-MCNC: 82
HBA1C MFR BLD HPLC: 5.6

## 2020-10-13 PROCEDURE — 82962 GLUCOSE BLOOD TEST: CPT

## 2020-10-13 PROCEDURE — 83036 HEMOGLOBIN GLYCOSYLATED A1C: CPT | Mod: QW

## 2020-10-13 PROCEDURE — 99213 OFFICE O/P EST LOW 20 MIN: CPT | Mod: 25

## 2020-10-13 NOTE — PHYSICAL EXAM
[Alert] : alert [Well Nourished] : well nourished [No Acute Distress] : no acute distress [Well Developed] : well developed [Normal Sclera/Conjunctiva] : normal sclera/conjunctiva [EOMI] : extra ocular movement intact [No Proptosis] : no proptosis [Normal Oropharynx] : the oropharynx was normal [Thyroid Not Enlarged] : the thyroid was not enlarged [No Thyroid Nodules] : no palpable thyroid nodules [No Respiratory Distress] : no respiratory distress [No Accessory Muscle Use] : no accessory muscle use [Clear to Auscultation] : lungs were clear to auscultation bilaterally [Normal S1, S2] : normal S1 and S2 [Normal Rate] : heart rate was normal [Regular Rhythm] : with a regular rhythm [No Edema] : no peripheral edema [Pedal Pulses Normal] : the pedal pulses are present [Normal Bowel Sounds] : normal bowel sounds [Not Tender] : non-tender [Not Distended] : not distended [Soft] : abdomen soft [No Stigmata of Cushings Syndrome] : no stigmata of Cushings Syndrome [Normal Gait] : normal gait [Normal Strength/Tone] : muscle strength and tone were normal [No Rash] : no rash [Acanthosis Nigricans] : no acanthosis nigricans [No Sensory Deficits] : the sensory exam was normal to light touch and pinprick [Normal Reflexes] : deep tendon reflexes were 2+ and symmetric [No Tremors] : no tremors [Normal Sensation on Monofilament Testing] : normal sensation on monofilament testing of lower extremities [Oriented x3] : oriented to person, place, and time

## 2020-10-13 NOTE — HISTORY OF PRESENT ILLNESS
[FreeTextEntry1] : Patient with a history of type 2 diabetes for many years. Prior to bariatric surgery he used to be on insulin, after that he had lost about 100 pounds and was maintained on oral agents. Currently he is on metformin 2000 mg a day and Trulicity 1.5 mg weekly.  He has had lost some weight.  He recently was told that he had a TIA and a few months ago he had a significant GI bleed. He is concerned about resuming aspirin. he has a h/o of hypothyroidism for many years; thyroid  sonogram was normal. He drink a large amount of fluid daily and at times sodium levels have been in 120's.

## 2020-10-13 NOTE — ASSESSMENT
[FreeTextEntry1] : The patient was well controlled type 2 diabetes, his current A1c is 5.6. We did discuss about compliance with his medications; continue Trulicity  to 1.5 mg weekly. Since he is back on aspirin because of the TIA he should discuss this with his gastroenterologist and be followed closely for any bleeding.Labs to be done tomorrow fasting  to also assess sodium level. Told to decrease his intake of fluids by one snapple bottle a day. He did notice some CP when walking plans to f/u with his cardiologist. Will check TFTs he has been stable on levothyroxine 75 ug.  [Diabetes Foot Care] : diabetes foot care [Long Term Vascular Complications] : long term vascular complications of diabetes [Carbohydrate Consistent Diet] : carbohydrate consistent diet [Importance of Diet and Exercise] : importance of diet and exercise to improve glycemic control, achieve weight loss and improve cardiovascular health [Self Monitoring of Blood Glucose] : self monitoring of blood glucose [Retinopathy Screening] : Patient was referred to ophthalmology for retinopathy screening [Diabetic Medications] : Risks and benefits of diabetic medications were discussed [Levothyroxine] : The patient was instructed to take Levothyroxine on an empty stomach, separate from vitamins, and wait at least 30 minutes before eating

## 2020-10-13 NOTE — REASON FOR VISIT
[Follow - Up] : a follow-up visit [DM Type 2] : DM Type 2 [Hypothyroidism] : hypothyroidism [Weight Management/Obesity] : weight management/obesity [Other___] : [unfilled]

## 2020-10-15 LAB
25(OH)D3 SERPL-MCNC: 49.1 NG/ML
ALBUMIN SERPL ELPH-MCNC: 4.9 G/DL
ALP BLD-CCNC: 99 U/L
ALT SERPL-CCNC: 31 U/L
ANION GAP SERPL CALC-SCNC: 11 MMOL/L
APPEARANCE: CLEAR
AST SERPL-CCNC: 28 U/L
BASOPHILS # BLD AUTO: 0.06 K/UL
BASOPHILS NFR BLD AUTO: 0.9 %
BILIRUB SERPL-MCNC: 0.5 MG/DL
BILIRUBIN URINE: NEGATIVE
BLOOD URINE: NEGATIVE
BUN SERPL-MCNC: 10 MG/DL
CALCIUM SERPL-MCNC: 9.9 MG/DL
CHLORIDE SERPL-SCNC: 96 MMOL/L
CHOLEST SERPL-MCNC: 123 MG/DL
CHOLEST/HDLC SERPL: 1.8 RATIO
CO2 SERPL-SCNC: 29 MMOL/L
COLOR: NORMAL
CREAT SERPL-MCNC: 0.89 MG/DL
CREAT SPEC-SCNC: 65 MG/DL
EOSINOPHIL # BLD AUTO: 0.29 K/UL
EOSINOPHIL NFR BLD AUTO: 4.4 %
FERRITIN SERPL-MCNC: 14 NG/ML
FOLATE SERPL-MCNC: >20 NG/ML
GLUCOSE QUALITATIVE U: NEGATIVE
GLUCOSE SERPL-MCNC: 97 MG/DL
HCT VFR BLD CALC: 48.4 %
HDLC SERPL-MCNC: 68 MG/DL
HGB BLD-MCNC: 15.5 G/DL
IMM GRANULOCYTES NFR BLD AUTO: 0.3 %
IRON SATN MFR SERPL: 7 %
IRON SERPL-MCNC: 38 UG/DL
KETONES URINE: NEGATIVE
LDLC SERPL CALC-MCNC: 40 MG/DL
LEUKOCYTE ESTERASE URINE: NEGATIVE
LYMPHOCYTES # BLD AUTO: 1.64 K/UL
LYMPHOCYTES NFR BLD AUTO: 24.7 %
MAN DIFF?: NORMAL
MCHC RBC-ENTMCNC: 27.1 PG
MCHC RBC-ENTMCNC: 32 GM/DL
MCV RBC AUTO: 84.8 FL
MICROALBUMIN 24H UR DL<=1MG/L-MCNC: 6.4 MG/DL
MICROALBUMIN/CREAT 24H UR-RTO: 98 MG/G
MONOCYTES # BLD AUTO: 0.75 K/UL
MONOCYTES NFR BLD AUTO: 11.3 %
NEUTROPHILS # BLD AUTO: 3.89 K/UL
NEUTROPHILS NFR BLD AUTO: 58.4 %
NITRITE URINE: NEGATIVE
OSMOLALITY SERPL: 286 MOSMOL/KG
OSMOLALITY UR: 362 MOSM/KG
PH URINE: 8.5
PLATELET # BLD AUTO: 271 K/UL
POTASSIUM SERPL-SCNC: 4.7 MMOL/L
PROT SERPL-MCNC: 7.6 G/DL
PROTEIN URINE: NORMAL
RBC # BLD: 5.71 M/UL
RBC # FLD: 16.4 %
SARS-COV-2 IGG SERPL IA-ACNC: <0.1 INDEX
SARS-COV-2 IGG SERPL QL IA: NEGATIVE
SAVE SPECIMEN: NORMAL
SODIUM SERPL-SCNC: 136 MMOL/L
SPECIFIC GRAVITY URINE: 1.01
T4 FREE SERPL-MCNC: 1.2 NG/DL
T4 SERPL-MCNC: 7.3 UG/DL
TIBC SERPL-MCNC: 515 UG/DL
TRIGL SERPL-MCNC: 76 MG/DL
TSH SERPL-ACNC: 4.59 UIU/ML
UIBC SERPL-MCNC: 477 UG/DL
UROBILINOGEN URINE: NORMAL
VIT B12 SERPL-MCNC: 848 PG/ML
WBC # FLD AUTO: 6.65 K/UL

## 2020-10-22 ENCOUNTER — NON-APPOINTMENT (OUTPATIENT)
Age: 60
End: 2020-10-22

## 2020-10-22 LAB — VASOPRESSIN SERPL-MCNC: <0.8 PG/ML

## 2020-10-28 ENCOUNTER — APPOINTMENT (OUTPATIENT)
Dept: ENDOCRINOLOGY | Facility: CLINIC | Age: 60
End: 2020-10-28

## 2020-11-23 ENCOUNTER — RX RENEWAL (OUTPATIENT)
Age: 60
End: 2020-11-23

## 2020-12-07 ENCOUNTER — RX RENEWAL (OUTPATIENT)
Age: 60
End: 2020-12-07

## 2021-01-07 ENCOUNTER — APPOINTMENT (OUTPATIENT)
Dept: ENDOCRINOLOGY | Facility: CLINIC | Age: 61
End: 2021-01-07
Payer: MEDICAID

## 2021-01-07 VITALS
HEART RATE: 57 BPM | TEMPERATURE: 97.5 F | SYSTOLIC BLOOD PRESSURE: 134 MMHG | BODY MASS INDEX: 34.93 KG/M2 | HEIGHT: 70 IN | WEIGHT: 244 LBS | DIASTOLIC BLOOD PRESSURE: 74 MMHG | OXYGEN SATURATION: 97 %

## 2021-01-07 LAB
GLUCOSE BLDC GLUCOMTR-MCNC: 105
HBA1C MFR BLD HPLC: 5.6

## 2021-01-07 PROCEDURE — 82962 GLUCOSE BLOOD TEST: CPT

## 2021-01-07 PROCEDURE — 83036 HEMOGLOBIN GLYCOSYLATED A1C: CPT | Mod: QW

## 2021-01-07 PROCEDURE — 99213 OFFICE O/P EST LOW 20 MIN: CPT | Mod: 25

## 2021-01-07 PROCEDURE — 99072 ADDL SUPL MATRL&STAF TM PHE: CPT

## 2021-01-07 PROCEDURE — 36415 COLL VENOUS BLD VENIPUNCTURE: CPT

## 2021-01-07 NOTE — HISTORY OF PRESENT ILLNESS
[FreeTextEntry1] : Patient with a history of type 2 diabetes for many years. Prior to bariatric surgery he used to be on insulin, after that he had lost about 100 pounds and was maintained on oral agents. Currently he is on metformin 2000 mg a day and Trulicity 1.5 mg weekly.  He has had lost some weight.  He recently was told that he had a TIA and a few months ago he had a significant GI bleed. He is concerned about resuming aspirin. he has a h/o of hypothyroidism for many years; thyroid  sonogram was normal. He drink a large amount of fluid daily and at times sodium levels have been in 120's. After cutting down liquid intake it improved.

## 2021-01-07 NOTE — ASSESSMENT
[FreeTextEntry1] : The patient was well controlled type 2 diabetes, his current A1c is 5.6. We did discuss about compliance with his medications; continue Trulicity  to 1.5 mg weekly. Since he is back on aspirin because of the TIA he should discuss this with his gastroenterologist and be followed closely for any bleeding.Labs done today to assess sodium level. Told to decrease his intake of fluids by one snapple bottle a day. He did notice some CP when walking plans to f/u with his cardiologist. Will check TFTs he has been stable on levothyroxine 75 ug.  [Carbohydrate Consistent Diet] : carbohydrate consistent diet [Importance of Diet and Exercise] : importance of diet and exercise to improve glycemic control, achieve weight loss and improve cardiovascular health [Weight Loss] : weight loss [Levothyroxine] : The patient was instructed to take Levothyroxine on an empty stomach, separate from vitamins, and wait at least 30 minutes before eating

## 2021-01-19 ENCOUNTER — RX RENEWAL (OUTPATIENT)
Age: 61
End: 2021-01-19

## 2021-02-25 LAB
25(OH)D3 SERPL-MCNC: 35.1 NG/ML
ALBUMIN SERPL ELPH-MCNC: 4.6 G/DL
ALP BLD-CCNC: 91 U/L
ALT SERPL-CCNC: 30 U/L
ANION GAP SERPL CALC-SCNC: 17 MMOL/L
AST SERPL-CCNC: 31 U/L
BASOPHILS # BLD AUTO: 0.06 K/UL
BASOPHILS NFR BLD AUTO: 0.8 %
BILIRUB SERPL-MCNC: 0.4 MG/DL
BUN SERPL-MCNC: 11 MG/DL
CALCIUM SERPL-MCNC: 9.5 MG/DL
CHLORIDE SERPL-SCNC: 97 MMOL/L
CO2 SERPL-SCNC: 17 MMOL/L
CREAT SERPL-MCNC: 0.81 MG/DL
EOSINOPHIL # BLD AUTO: 0.29 K/UL
EOSINOPHIL NFR BLD AUTO: 3.9 %
FERRITIN SERPL-MCNC: 24 NG/ML
FOLATE SERPL-MCNC: 19.8 NG/ML
GLUCOSE SERPL-MCNC: 84 MG/DL
HCT VFR BLD CALC: 40.3 %
HGB BLD-MCNC: 12.8 G/DL
IMM GRANULOCYTES NFR BLD AUTO: 0.3 %
IRON SATN MFR SERPL: 6 %
IRON SERPL-MCNC: 28 UG/DL
LYMPHOCYTES # BLD AUTO: 1.78 K/UL
LYMPHOCYTES NFR BLD AUTO: 24.1 %
MAN DIFF?: NORMAL
MCHC RBC-ENTMCNC: 25.7 PG
MCHC RBC-ENTMCNC: 31.8 GM/DL
MCV RBC AUTO: 80.9 FL
MONOCYTES # BLD AUTO: 0.96 K/UL
MONOCYTES NFR BLD AUTO: 13 %
NEUTROPHILS # BLD AUTO: 4.27 K/UL
NEUTROPHILS NFR BLD AUTO: 57.9 %
PLATELET # BLD AUTO: 278 K/UL
POTASSIUM SERPL-SCNC: 5.1 MMOL/L
PROT SERPL-MCNC: 6.5 G/DL
RBC # BLD: 4.98 M/UL
RBC # FLD: 15.3 %
SAVE SPECIMEN: NORMAL
SODIUM SERPL-SCNC: 131 MMOL/L
T4 FREE SERPL-MCNC: 1 NG/DL
T4 SERPL-MCNC: 10.1 UG/DL
TIBC SERPL-MCNC: 458 UG/DL
TSH SERPL-ACNC: 1.19 UIU/ML
UIBC SERPL-MCNC: 429 UG/DL
VIT B12 SERPL-MCNC: 689 PG/ML
WBC # FLD AUTO: 7.38 K/UL

## 2021-03-11 ENCOUNTER — RX RENEWAL (OUTPATIENT)
Age: 61
End: 2021-03-11

## 2021-04-19 ENCOUNTER — APPOINTMENT (OUTPATIENT)
Dept: ENDOCRINOLOGY | Facility: CLINIC | Age: 61
End: 2021-04-19
Payer: MEDICAID

## 2021-04-19 VITALS
TEMPERATURE: 97.5 F | HEIGHT: 70 IN | DIASTOLIC BLOOD PRESSURE: 75 MMHG | BODY MASS INDEX: 33.21 KG/M2 | OXYGEN SATURATION: 97 % | SYSTOLIC BLOOD PRESSURE: 125 MMHG | HEART RATE: 57 BPM | WEIGHT: 232 LBS

## 2021-04-19 LAB
GLUCOSE BLDC GLUCOMTR-MCNC: 117
HBA1C MFR BLD HPLC: 5.7

## 2021-04-19 PROCEDURE — 83036 HEMOGLOBIN GLYCOSYLATED A1C: CPT | Mod: QW

## 2021-04-19 PROCEDURE — 36415 COLL VENOUS BLD VENIPUNCTURE: CPT

## 2021-04-19 PROCEDURE — 99214 OFFICE O/P EST MOD 30 MIN: CPT | Mod: 25

## 2021-04-19 PROCEDURE — 99072 ADDL SUPL MATRL&STAF TM PHE: CPT

## 2021-04-19 PROCEDURE — 82962 GLUCOSE BLOOD TEST: CPT

## 2021-04-19 RX ORDER — BETAMETHASONE DIPROPIONATE 0.5 MG/G
0.05 CREAM TOPICAL
Qty: 15 | Refills: 0 | Status: ACTIVE | COMMUNITY
Start: 2020-11-16

## 2021-04-19 RX ORDER — AMOXICILLIN 500 MG/1
500 CAPSULE ORAL
Qty: 21 | Refills: 0 | Status: ACTIVE | COMMUNITY
Start: 2021-03-22

## 2021-04-19 NOTE — HISTORY OF PRESENT ILLNESS
[FreeTextEntry1] : Patient with a history of type 2 diabetes for many years. Prior to bariatric surgery he used to be on insulin, after that he had lost about 100 pounds and was maintained on oral agents. Currently he is on metformin 2000 mg a day and Trulicity 1.5 mg weekly.  He has had lost some weight.  He recently was told that he had a TIA and a few months ago he had a significant GI bleed. He is concerned about resuming aspirin. he has a h/o of hypothyroidism for many years; thyroid  sonogram was normal. He drinks a large amount of fluid daily and at times sodium levels have been in 120's. After cutting down liquid intake it improved.

## 2021-04-19 NOTE — ASSESSMENT
[FreeTextEntry1] : The patient was well controlled type 2 diabetes, his current A1c is 5.6. We did discuss about compliance with his medications; continue Trulicity  to 1.5 mg weekly. Since he is back on aspirin because of the TIA he should discuss this with his gastroenterologist and be followed closely for any bleeding.Labs done today to assess sodium level. Told to decrease his intake of fluids by one snapple bottle a day. He did notice some CP when walking plans to f/u with his cardiologist. Will check TFTs he has been stable on levothyroxine 75 ug.  [Carbohydrate Consistent Diet] : carbohydrate consistent diet [Importance of Diet and Exercise] : importance of diet and exercise to improve glycemic control, achieve weight loss and improve cardiovascular health [Self Monitoring of Blood Glucose] : self monitoring of blood glucose [Levothyroxine] : The patient was instructed to take Levothyroxine on an empty stomach, separate from vitamins, and wait at least 30 minutes before eating

## 2021-04-20 LAB
25(OH)D3 SERPL-MCNC: 42.7 NG/ML
ALBUMIN SERPL ELPH-MCNC: 4.6 G/DL
ALP BLD-CCNC: 97 U/L
ALT SERPL-CCNC: 40 U/L
ANION GAP SERPL CALC-SCNC: 11 MMOL/L
APPEARANCE: CLEAR
AST SERPL-CCNC: 38 U/L
BASOPHILS # BLD AUTO: 0.06 K/UL
BASOPHILS NFR BLD AUTO: 0.9 %
BILIRUB SERPL-MCNC: 0.4 MG/DL
BILIRUBIN URINE: NEGATIVE
BLOOD URINE: NEGATIVE
BUN SERPL-MCNC: 13 MG/DL
CALCIUM SERPL-MCNC: 9.6 MG/DL
CHLORIDE SERPL-SCNC: 96 MMOL/L
CHOLEST SERPL-MCNC: 118 MG/DL
CO2 SERPL-SCNC: 25 MMOL/L
COLOR: YELLOW
CREAT SERPL-MCNC: 0.85 MG/DL
CREAT SPEC-SCNC: 78 MG/DL
EOSINOPHIL # BLD AUTO: 0.24 K/UL
EOSINOPHIL NFR BLD AUTO: 3.7 %
ESTIMATED AVERAGE GLUCOSE: 117 MG/DL
FERRITIN SERPL-MCNC: 9 NG/ML
GLUCOSE QUALITATIVE U: NEGATIVE
GLUCOSE SERPL-MCNC: 106 MG/DL
HBA1C MFR BLD HPLC: 5.7 %
HCT VFR BLD CALC: 42.3 %
HDLC SERPL-MCNC: 67 MG/DL
HGB BLD-MCNC: 13.2 G/DL
IMM GRANULOCYTES NFR BLD AUTO: 0.2 %
IRON SATN MFR SERPL: 6 %
IRON SERPL-MCNC: 31 UG/DL
KETONES URINE: NEGATIVE
LDLC SERPL CALC-MCNC: 40 MG/DL
LEUKOCYTE ESTERASE URINE: NEGATIVE
LYMPHOCYTES # BLD AUTO: 1.79 K/UL
LYMPHOCYTES NFR BLD AUTO: 27.3 %
MAN DIFF?: NORMAL
MCHC RBC-ENTMCNC: 25.1 PG
MCHC RBC-ENTMCNC: 31.2 GM/DL
MCV RBC AUTO: 80.6 FL
MICROALBUMIN 24H UR DL<=1MG/L-MCNC: 4.6 MG/DL
MICROALBUMIN/CREAT 24H UR-RTO: 59 MG/G
MONOCYTES # BLD AUTO: 0.78 K/UL
MONOCYTES NFR BLD AUTO: 11.9 %
NEUTROPHILS # BLD AUTO: 3.67 K/UL
NEUTROPHILS NFR BLD AUTO: 56 %
NITRITE URINE: NEGATIVE
NONHDLC SERPL-MCNC: 51 MG/DL
PH URINE: 7
PLATELET # BLD AUTO: 298 K/UL
POTASSIUM SERPL-SCNC: 4.6 MMOL/L
PROT SERPL-MCNC: 7.2 G/DL
PROTEIN URINE: NORMAL
RBC # BLD: 5.25 M/UL
RBC # FLD: 16.8 %
SODIUM SERPL-SCNC: 132 MMOL/L
SPECIFIC GRAVITY URINE: 1.01
T4 FREE SERPL-MCNC: 1.1 NG/DL
T4 SERPL-MCNC: 7.1 UG/DL
TIBC SERPL-MCNC: 510 UG/DL
TRIGL SERPL-MCNC: 54 MG/DL
TSH SERPL-ACNC: 2.63 UIU/ML
UIBC SERPL-MCNC: 479 UG/DL
UROBILINOGEN URINE: ABNORMAL
WBC # FLD AUTO: 6.55 K/UL

## 2021-06-13 ENCOUNTER — RX RENEWAL (OUTPATIENT)
Age: 61
End: 2021-06-13

## 2021-07-01 ENCOUNTER — APPOINTMENT (OUTPATIENT)
Dept: ENDOCRINOLOGY | Facility: CLINIC | Age: 61
End: 2021-07-01

## 2021-07-12 ENCOUNTER — APPOINTMENT (OUTPATIENT)
Dept: ENDOCRINOLOGY | Facility: CLINIC | Age: 61
End: 2021-07-12
Payer: MEDICAID

## 2021-07-12 VITALS
DIASTOLIC BLOOD PRESSURE: 75 MMHG | BODY MASS INDEX: 32.93 KG/M2 | WEIGHT: 230 LBS | OXYGEN SATURATION: 98 % | HEIGHT: 70 IN | HEART RATE: 52 BPM | SYSTOLIC BLOOD PRESSURE: 142 MMHG

## 2021-07-12 LAB
GLUCOSE BLDC GLUCOMTR-MCNC: 90
HBA1C MFR BLD HPLC: 5.6

## 2021-07-12 PROCEDURE — 83036 HEMOGLOBIN GLYCOSYLATED A1C: CPT | Mod: QW

## 2021-07-12 PROCEDURE — 99214 OFFICE O/P EST MOD 30 MIN: CPT | Mod: 25

## 2021-07-12 PROCEDURE — 99072 ADDL SUPL MATRL&STAF TM PHE: CPT

## 2021-07-12 PROCEDURE — 82962 GLUCOSE BLOOD TEST: CPT

## 2021-07-12 PROCEDURE — 36415 COLL VENOUS BLD VENIPUNCTURE: CPT

## 2021-07-12 NOTE — ASSESSMENT
[FreeTextEntry1] : The patient was well controlled type 2 diabetes, his current A1c is 5.6. We did discuss about compliance with his medications; continue Trulicity   can increase to 3  mg weekly. Since he is back on aspirin because of the TIA he should discuss this with his gastroenterologist and be followed closely for any bleeding.Labs done today to assess sodium level. Told to decrease his intake of fluids by one snapple bottle a day. He did notice some CP when walking plans to f/u with his cardiologist. Will check TFTs he has been stable on levothyroxine 75 ug.  [Diabetes Foot Care] : diabetes foot care [Long Term Vascular Complications] : long term vascular complications of diabetes [Carbohydrate Consistent Diet] : carbohydrate consistent diet [Importance of Diet and Exercise] : importance of diet and exercise to improve glycemic control, achieve weight loss and improve cardiovascular health [Self Monitoring of Blood Glucose] : self monitoring of blood glucose [Retinopathy Screening] : Patient was referred to ophthalmology for retinopathy screening [Diabetic Medications] : Risks and benefits of diabetic medications were discussed [Levothyroxine] : The patient was instructed to take Levothyroxine on an empty stomach, separate from vitamins, and wait at least 30 minutes before eating

## 2021-07-13 LAB
ALBUMIN SERPL ELPH-MCNC: 4.8 G/DL
ALP BLD-CCNC: 94 U/L
ALT SERPL-CCNC: 27 U/L
ANION GAP SERPL CALC-SCNC: 12 MMOL/L
APPEARANCE: CLEAR
AST SERPL-CCNC: 31 U/L
BASOPHILS # BLD AUTO: 0.07 K/UL
BASOPHILS NFR BLD AUTO: 1.2 %
BILIRUB SERPL-MCNC: 0.6 MG/DL
BILIRUBIN URINE: NEGATIVE
BLOOD URINE: NEGATIVE
BUN SERPL-MCNC: 8 MG/DL
CALCIUM SERPL-MCNC: 10.3 MG/DL
CHLORIDE SERPL-SCNC: 95 MMOL/L
CHOLEST SERPL-MCNC: 128 MG/DL
CO2 SERPL-SCNC: 24 MMOL/L
COLOR: YELLOW
CREAT SERPL-MCNC: 0.88 MG/DL
CREAT SPEC-SCNC: 59 MG/DL
EOSINOPHIL # BLD AUTO: 0.22 K/UL
EOSINOPHIL NFR BLD AUTO: 3.6 %
ESTIMATED AVERAGE GLUCOSE: 117 MG/DL
FERRITIN SERPL-MCNC: 8 NG/ML
FOLATE SERPL-MCNC: >20 NG/ML
GLUCOSE QUALITATIVE U: NEGATIVE
GLUCOSE SERPL-MCNC: 92 MG/DL
HBA1C MFR BLD HPLC: 5.7 %
HCT VFR BLD CALC: 43.6 %
HDLC SERPL-MCNC: 74 MG/DL
HGB BLD-MCNC: 13.4 G/DL
IMM GRANULOCYTES NFR BLD AUTO: 0.2 %
IRON SATN MFR SERPL: 5 %
IRON SERPL-MCNC: 32 UG/DL
KETONES URINE: NEGATIVE
LDLC SERPL CALC-MCNC: 42 MG/DL
LEUKOCYTE ESTERASE URINE: NEGATIVE
LYMPHOCYTES # BLD AUTO: 1.65 K/UL
LYMPHOCYTES NFR BLD AUTO: 27.4 %
MAN DIFF?: NORMAL
MCHC RBC-ENTMCNC: 24.1 PG
MCHC RBC-ENTMCNC: 30.7 GM/DL
MCV RBC AUTO: 78.3 FL
MICROALBUMIN 24H UR DL<=1MG/L-MCNC: 5.8 MG/DL
MICROALBUMIN/CREAT 24H UR-RTO: 98 MG/G
MONOCYTES # BLD AUTO: 0.69 K/UL
MONOCYTES NFR BLD AUTO: 11.4 %
NEUTROPHILS # BLD AUTO: 3.39 K/UL
NEUTROPHILS NFR BLD AUTO: 56.2 %
NITRITE URINE: NEGATIVE
NONHDLC SERPL-MCNC: 54 MG/DL
OSMOLALITY SERPL: 275 MOSMOL/KG
PH URINE: 8
PLATELET # BLD AUTO: 298 K/UL
POTASSIUM SERPL-SCNC: 5 MMOL/L
PROT SERPL-MCNC: 7.7 G/DL
PROTEIN URINE: NORMAL
RBC # BLD: 5.57 M/UL
RBC # FLD: 17.9 %
SODIUM SERPL-SCNC: 132 MMOL/L
SPECIFIC GRAVITY URINE: 1.01
T4 FREE SERPL-MCNC: 1.3 NG/DL
T4 SERPL-MCNC: 7.7 UG/DL
TIBC SERPL-MCNC: 610 UG/DL
TRIGL SERPL-MCNC: 62 MG/DL
TSH SERPL-ACNC: 3.25 UIU/ML
UIBC SERPL-MCNC: 578 UG/DL
UROBILINOGEN URINE: NORMAL
VIT B12 SERPL-MCNC: 1180 PG/ML
WBC # FLD AUTO: 6.03 K/UL

## 2021-10-21 ENCOUNTER — APPOINTMENT (OUTPATIENT)
Dept: ENDOCRINOLOGY | Facility: CLINIC | Age: 61
End: 2021-10-21

## 2021-12-14 ENCOUNTER — LABORATORY RESULT (OUTPATIENT)
Age: 61
End: 2021-12-14

## 2021-12-14 ENCOUNTER — APPOINTMENT (OUTPATIENT)
Dept: ENDOCRINOLOGY | Facility: CLINIC | Age: 61
End: 2021-12-14
Payer: MEDICAID

## 2021-12-14 VITALS
OXYGEN SATURATION: 100 % | HEIGHT: 70 IN | HEART RATE: 62 BPM | DIASTOLIC BLOOD PRESSURE: 78 MMHG | BODY MASS INDEX: 31.96 KG/M2 | WEIGHT: 223.25 LBS | SYSTOLIC BLOOD PRESSURE: 122 MMHG

## 2021-12-14 LAB
GLUCOSE BLDC GLUCOMTR-MCNC: 86
HBA1C MFR BLD HPLC: 5.3

## 2021-12-14 PROCEDURE — 99214 OFFICE O/P EST MOD 30 MIN: CPT | Mod: 25

## 2021-12-14 PROCEDURE — 83036 HEMOGLOBIN GLYCOSYLATED A1C: CPT | Mod: QW

## 2021-12-14 PROCEDURE — 82962 GLUCOSE BLOOD TEST: CPT

## 2021-12-14 NOTE — HISTORY OF PRESENT ILLNESS
[FreeTextEntry1] : Patient with a history of type 2 diabetes for many years. Prior to bariatric surgery he used to be on insulin, after that he had lost about 100 pounds and was maintained on oral agents. Currently he is on metformin 2000 mg a day and Trulicity  3  mg weekly.  has been losing weight.   He recently was told that he had a TIA and a few months ago he had a significant GI bleed. He is concerned about resuming aspirin. he has a h/o of hypothyroidism for many years; thyroid  sonogram was normal. He drinks a large amount of fluid daily and at times sodium levels have been in 120's. After cutting down liquid intake it improved. \par has not had Covid or the vaccine.

## 2021-12-14 NOTE — PHYSICAL EXAM
[Alert] : alert [Well Nourished] : well nourished [No Acute Distress] : no acute distress [Well Developed] : well developed [EOMI] : extra ocular movement intact [No Proptosis] : no proptosis [Normal Oropharynx] : the oropharynx was normal [Thyroid Not Enlarged] : the thyroid was not enlarged [No Thyroid Nodules] : no palpable thyroid nodules [No Respiratory Distress] : no respiratory distress [No Accessory Muscle Use] : no accessory muscle use [Clear to Auscultation] : lungs were clear to auscultation bilaterally [Normal S1, S2] : normal S1 and S2 [Normal Rate] : heart rate was normal [Regular Rhythm] : with a regular rhythm [No Edema] : no peripheral edema [Pedal Pulses Normal] : the pedal pulses are present [Normal Bowel Sounds] : normal bowel sounds [Not Tender] : non-tender [Not Distended] : not distended [Soft] : abdomen soft [No Stigmata of Cushings Syndrome] : no stigmata of Cushings Syndrome [Normal Gait] : normal gait [Normal Strength/Tone] : muscle strength and tone were normal [No Rash] : no rash [Acanthosis Nigricans] : no acanthosis nigricans [No Sensory Deficits] : the sensory exam was normal to light touch and pinprick [Normal Reflexes] : deep tendon reflexes were 2+ and symmetric [No Tremors] : no tremors [Normal Sensation on Monofilament Testing] : normal sensation on monofilament testing of lower extremities [Oriented x3] : oriented to person, place, and time [de-identified] : sclera pale

## 2021-12-14 NOTE — ASSESSMENT
[FreeTextEntry1] : The patient was well controlled type 2 diabetes, his current A1c is 5.4. We did discuss about compliance with his medications; continue Trulicity   can remain on  3  mg weekly. Since he is back on aspirin because of the TIA he should discuss this with his gastroenterologist and be followed closely for any bleeding.\par Has h/o low Na; Told to decrease his intake of fluids by one snapple bottle a day.\par Will check TFTs he has been stable on levothyroxine 75 ug. \par to do labs  fasting in am.  [Diabetes Foot Care] : diabetes foot care [Long Term Vascular Complications] : long term vascular complications of diabetes [Carbohydrate Consistent Diet] : carbohydrate consistent diet [Importance of Diet and Exercise] : importance of diet and exercise to improve glycemic control, achieve weight loss and improve cardiovascular health [Hypoglycemia Management] : hypoglycemia management [Self Monitoring of Blood Glucose] : self monitoring of blood glucose [Retinopathy Screening] : Patient was referred to ophthalmology for retinopathy screening [Diabetic Medications] : Risks and benefits of diabetic medications were discussed [Levothyroxine] : The patient was instructed to take Levothyroxine on an empty stomach, separate from vitamins, and wait at least 30 minutes before eating

## 2021-12-16 LAB
25(OH)D3 SERPL-MCNC: 45.5 NG/ML
ALBUMIN SERPL ELPH-MCNC: 5 G/DL
ALP BLD-CCNC: 79 U/L
ALT SERPL-CCNC: 24 U/L
ANION GAP SERPL CALC-SCNC: 20 MMOL/L
APPEARANCE: CLEAR
AST SERPL-CCNC: 32 U/L
BASOPHILS # BLD AUTO: 0.08 K/UL
BASOPHILS NFR BLD AUTO: 1.3 %
BILIRUB SERPL-MCNC: 0.5 MG/DL
BILIRUBIN URINE: NEGATIVE
BLOOD URINE: NEGATIVE
BUN SERPL-MCNC: 11 MG/DL
CALCIUM SERPL-MCNC: 10.3 MG/DL
CHLORIDE SERPL-SCNC: 96 MMOL/L
CHOLEST SERPL-MCNC: 128 MG/DL
CO2 SERPL-SCNC: 21 MMOL/L
COLOR: YELLOW
CREAT SERPL-MCNC: 0.98 MG/DL
CREAT SPEC-SCNC: 137 MG/DL
EOSINOPHIL # BLD AUTO: 0.27 K/UL
EOSINOPHIL NFR BLD AUTO: 4.3 %
FERRITIN SERPL-MCNC: 9 NG/ML
FOLATE SERPL-MCNC: >20 NG/ML
GLUCOSE QUALITATIVE U: NEGATIVE
GLUCOSE SERPL-MCNC: 78 MG/DL
HCT VFR BLD CALC: 40 %
HDLC SERPL-MCNC: 64 MG/DL
HGB BLD-MCNC: 12.6 G/DL
IMM GRANULOCYTES NFR BLD AUTO: 0.2 %
IRON SATN MFR SERPL: 6 %
IRON SERPL-MCNC: 34 UG/DL
KETONES URINE: NEGATIVE
LDLC SERPL CALC-MCNC: 49 MG/DL
LEUKOCYTE ESTERASE URINE: NEGATIVE
LYMPHOCYTES # BLD AUTO: 1.36 K/UL
LYMPHOCYTES NFR BLD AUTO: 21.7 %
MAN DIFF?: NORMAL
MCHC RBC-ENTMCNC: 24.5 PG
MCHC RBC-ENTMCNC: 31.5 GM/DL
MCV RBC AUTO: 77.8 FL
MICROALBUMIN 24H UR DL<=1MG/L-MCNC: 9 MG/DL
MICROALBUMIN/CREAT 24H UR-RTO: 65 MG/G
MONOCYTES # BLD AUTO: 0.82 K/UL
MONOCYTES NFR BLD AUTO: 13.1 %
NEUTROPHILS # BLD AUTO: 3.73 K/UL
NEUTROPHILS NFR BLD AUTO: 59.4 %
NITRITE URINE: NEGATIVE
NONHDLC SERPL-MCNC: 65 MG/DL
PH URINE: 7.5
PLATELET # BLD AUTO: 298 K/UL
POTASSIUM SERPL-SCNC: 4.8 MMOL/L
PROT SERPL-MCNC: 7.9 G/DL
PROTEIN URINE: ABNORMAL
RBC # BLD: 5.14 M/UL
RBC # FLD: 17.1 %
SODIUM SERPL-SCNC: 137 MMOL/L
SPECIFIC GRAVITY URINE: 1.02
T4 FREE SERPL-MCNC: 1.4 NG/DL
T4 SERPL-MCNC: 8.8 UG/DL
TIBC SERPL-MCNC: 553 UG/DL
TRIGL SERPL-MCNC: 80 MG/DL
TSH SERPL-ACNC: 2.44 UIU/ML
UIBC SERPL-MCNC: 519 UG/DL
UROBILINOGEN URINE: NORMAL
VIT B12 SERPL-MCNC: 1316 PG/ML
WBC # FLD AUTO: 6.27 K/UL

## 2022-01-04 ENCOUNTER — NON-APPOINTMENT (OUTPATIENT)
Age: 62
End: 2022-01-04

## 2022-03-22 ENCOUNTER — APPOINTMENT (OUTPATIENT)
Dept: ENDOCRINOLOGY | Facility: CLINIC | Age: 62
End: 2022-03-22

## 2022-05-03 ENCOUNTER — RX RENEWAL (OUTPATIENT)
Age: 62
End: 2022-05-03

## 2022-07-12 ENCOUNTER — APPOINTMENT (OUTPATIENT)
Dept: ENDOCRINOLOGY | Facility: CLINIC | Age: 62
End: 2022-07-12

## 2022-07-12 VITALS
HEART RATE: 59 BPM | SYSTOLIC BLOOD PRESSURE: 136 MMHG | OXYGEN SATURATION: 99 % | DIASTOLIC BLOOD PRESSURE: 74 MMHG | WEIGHT: 231.5 LBS | BODY MASS INDEX: 33.14 KG/M2 | HEIGHT: 70 IN

## 2022-07-12 LAB
GLUCOSE BLDC GLUCOMTR-MCNC: 78
HBA1C MFR BLD HPLC: 5

## 2022-07-12 PROCEDURE — 99213 OFFICE O/P EST LOW 20 MIN: CPT | Mod: 25

## 2022-07-12 PROCEDURE — 82962 GLUCOSE BLOOD TEST: CPT

## 2022-07-12 PROCEDURE — 83036 HEMOGLOBIN GLYCOSYLATED A1C: CPT | Mod: QW

## 2022-07-12 RX ORDER — MINOCYCLINE HYDROCHLORIDE 100 MG/1
100 CAPSULE ORAL
Qty: 60 | Refills: 0 | Status: ACTIVE | COMMUNITY
Start: 2022-05-03

## 2022-07-12 RX ORDER — OMEPRAZOLE 40 MG/1
40 CAPSULE, DELAYED RELEASE ORAL
Qty: 30 | Refills: 0 | Status: ACTIVE | COMMUNITY
Start: 2021-12-28

## 2022-07-12 RX ORDER — NAPROXEN 500 MG/1
500 TABLET ORAL
Qty: 20 | Refills: 0 | Status: ACTIVE | COMMUNITY
Start: 2022-01-31

## 2022-07-12 NOTE — PHYSICAL EXAM
[Alert] : alert [Well Nourished] : well nourished [No Acute Distress] : no acute distress [Well Developed] : well developed [EOMI] : extra ocular movement intact [No Proptosis] : no proptosis [Normal Oropharynx] : the oropharynx was normal [Thyroid Not Enlarged] : the thyroid was not enlarged [No Thyroid Nodules] : no palpable thyroid nodules [No Respiratory Distress] : no respiratory distress [No Accessory Muscle Use] : no accessory muscle use [Clear to Auscultation] : lungs were clear to auscultation bilaterally [Normal S1, S2] : normal S1 and S2 [Normal Rate] : heart rate was normal [Regular Rhythm] : with a regular rhythm [No Edema] : no peripheral edema [Pedal Pulses Normal] : the pedal pulses are present [Normal Bowel Sounds] : normal bowel sounds [Not Tender] : non-tender [Not Distended] : not distended [Soft] : abdomen soft [No Stigmata of Cushings Syndrome] : no stigmata of Cushings Syndrome [Normal Gait] : normal gait [Normal Strength/Tone] : muscle strength and tone were normal [No Rash] : no rash [Acanthosis Nigricans] : no acanthosis nigricans [No Sensory Deficits] : the sensory exam was normal to light touch and pinprick [Normal Reflexes] : deep tendon reflexes were 2+ and symmetric [No Tremors] : no tremors [Normal Sensation on Monofilament Testing] : normal sensation on monofilament testing of lower extremities [Oriented x3] : oriented to person, place, and time [de-identified] : sclera pale

## 2022-07-12 NOTE — ASSESSMENT
[FreeTextEntry1] : The patient was well controlled type 2 diabetes, his current A1c is 5.0. We did discuss about compliance with his medications; continue Trulicity   can remain on  3  mg weekly.  Will consider change to Mounjaro will see if insurance covers. Since he is back on aspirin because of the TIA he should discuss this with his gastroenterologist and be followed closely for any bleeding.\christina Has h/o low Na; Told to decrease his intake of fluids by one snapple bottle a day.\christina Will check TFTs he has been stable on levothyroxine 75 ug. \par to do labs  fasting in am.  [Carbohydrate Consistent Diet] : carbohydrate consistent diet [Importance of Diet and Exercise] : importance of diet and exercise to improve glycemic control, achieve weight loss and improve cardiovascular health [Self Monitoring of Blood Glucose] : self monitoring of blood glucose [Diabetic Medications] : Risks and benefits of diabetic medications were discussed [Levothyroxine] : The patient was instructed to take Levothyroxine on an empty stomach, separate from vitamins, and wait at least 30 minutes before eating

## 2022-09-18 ENCOUNTER — RX RENEWAL (OUTPATIENT)
Age: 62
End: 2022-09-18

## 2022-11-03 ENCOUNTER — APPOINTMENT (OUTPATIENT)
Dept: ENDOCRINOLOGY | Facility: CLINIC | Age: 62
End: 2022-11-03

## 2022-11-07 ENCOUNTER — APPOINTMENT (OUTPATIENT)
Dept: ENDOCRINOLOGY | Facility: CLINIC | Age: 62
End: 2022-11-07

## 2022-11-07 VITALS
HEIGHT: 70 IN | BODY MASS INDEX: 32.96 KG/M2 | SYSTOLIC BLOOD PRESSURE: 118 MMHG | WEIGHT: 230.25 LBS | OXYGEN SATURATION: 98 % | HEART RATE: 62 BPM | DIASTOLIC BLOOD PRESSURE: 70 MMHG

## 2022-11-07 DIAGNOSIS — S91.309A UNSPECIFIED OPEN WOUND, UNSPECIFIED FOOT, INITIAL ENCOUNTER: ICD-10-CM

## 2022-11-07 LAB
GLUCOSE BLDC GLUCOMTR-MCNC: 127
HBA1C MFR BLD HPLC: 5.3

## 2022-11-07 PROCEDURE — 83036 HEMOGLOBIN GLYCOSYLATED A1C: CPT | Mod: QW

## 2022-11-07 PROCEDURE — 82962 GLUCOSE BLOOD TEST: CPT

## 2022-11-07 PROCEDURE — 99213 OFFICE O/P EST LOW 20 MIN: CPT | Mod: 25

## 2022-11-07 RX ORDER — TIRZEPATIDE 5 MG/.5ML
5 INJECTION, SOLUTION SUBCUTANEOUS
Qty: 1 | Refills: 1 | Status: COMPLETED | COMMUNITY
Start: 2022-07-12 | End: 2022-11-07

## 2022-11-07 RX ORDER — DULAGLUTIDE 3 MG/.5ML
3 INJECTION, SOLUTION SUBCUTANEOUS
Qty: 6 | Refills: 1 | Status: COMPLETED | COMMUNITY
Start: 2018-10-11 | End: 2022-11-07

## 2022-11-07 NOTE — PHYSICAL EXAM
[Alert] : alert [Well Nourished] : well nourished [No Acute Distress] : no acute distress [Well Developed] : well developed [EOMI] : extra ocular movement intact [No Proptosis] : no proptosis [Normal Oropharynx] : the oropharynx was normal [Thyroid Not Enlarged] : the thyroid was not enlarged [No Thyroid Nodules] : no palpable thyroid nodules [No Respiratory Distress] : no respiratory distress [No Accessory Muscle Use] : no accessory muscle use [Clear to Auscultation] : lungs were clear to auscultation bilaterally [Normal S1, S2] : normal S1 and S2 [Normal Rate] : heart rate was normal [Regular Rhythm] : with a regular rhythm [No Edema] : no peripheral edema [Pedal Pulses Normal] : the pedal pulses are present [Normal Bowel Sounds] : normal bowel sounds [Not Tender] : non-tender [Not Distended] : not distended [Soft] : abdomen soft [No Stigmata of Cushings Syndrome] : no stigmata of Cushings Syndrome [Normal Gait] : normal gait [Normal Strength/Tone] : muscle strength and tone were normal [No Rash] : no rash [No Sensory Deficits] : the sensory exam was normal to light touch and pinprick [Normal Reflexes] : deep tendon reflexes were 2+ and symmetric [No Tremors] : no tremors [Normal Sensation on Monofilament Testing] : normal sensation on monofilament testing of lower extremities [Oriented x3] : oriented to person, place, and time [Acanthosis Nigricans] : no acanthosis nigricans [de-identified] : sclera pale

## 2022-11-07 NOTE — HISTORY OF PRESENT ILLNESS
[FreeTextEntry1] : Patient with a history of type 2 diabetes for many years. Prior to bariatric surgery he used to be on insulin, after that he had lost about 100 pounds and was maintained on oral agents. Currently he is on metformin 2000 mg a day and Trulicity  1.5  mg weekly.  has been losing weight.   He recently was told that he had a TIA and a few months ago he had a significant GI bleed. He is concerned about resuming aspirin. he has a h/o of hypothyroidism for many years; thyroid  sonogram was normal. He drinks a large amount of fluid daily and at times sodium levels have been in 120's. After cutting down liquid intake it improved. \par has not had Covid or the vaccine.

## 2022-11-07 NOTE — ASSESSMENT
[FreeTextEntry1] : The patient was well controlled type 2 diabetes, his current A1c is 5.3. We did discuss about compliance with his medications; continue Trulicity   increase to  3  mg weekly.  Mounjaro and Ozempic not covered.  Since he is back on aspirin because of the TIA he should discuss this with his gastroenterologist and be followed closely for any bleeding.\par Has h/o low Na; Told to decrease his intake of fluids by one snapple bottle a day.\christina Will check TFTs he has been stable on levothyroxine 75 ug. \par to do labs  fasting in am.  [Diabetes Foot Care] : diabetes foot care [Long Term Vascular Complications] : long term vascular complications of diabetes [Carbohydrate Consistent Diet] : carbohydrate consistent diet [Importance of Diet and Exercise] : importance of diet and exercise to improve glycemic control, achieve weight loss and improve cardiovascular health [Self Monitoring of Blood Glucose] : self monitoring of blood glucose [Diabetic Medications] : Risks and benefits of diabetic medications were discussed

## 2022-11-28 ENCOUNTER — RX RENEWAL (OUTPATIENT)
Age: 62
End: 2022-11-28

## 2023-01-12 ENCOUNTER — APPOINTMENT (OUTPATIENT)
Dept: ENDOCRINOLOGY | Facility: CLINIC | Age: 63
End: 2023-01-12
Payer: SELF-PAY

## 2023-01-12 VITALS
BODY MASS INDEX: 33.27 KG/M2 | DIASTOLIC BLOOD PRESSURE: 76 MMHG | WEIGHT: 232.38 LBS | OXYGEN SATURATION: 97 % | HEIGHT: 70 IN | HEART RATE: 67 BPM | SYSTOLIC BLOOD PRESSURE: 146 MMHG

## 2023-01-12 LAB — GLUCOSE BLDC GLUCOMTR-MCNC: 70

## 2023-01-12 PROCEDURE — 82962 GLUCOSE BLOOD TEST: CPT

## 2023-01-12 PROCEDURE — 99213 OFFICE O/P EST LOW 20 MIN: CPT | Mod: 25

## 2023-01-12 RX ORDER — DULAGLUTIDE 0.75 MG/.5ML
0.75 INJECTION, SOLUTION SUBCUTANEOUS
Qty: 2 | Refills: 0 | Status: COMPLETED | COMMUNITY
Start: 2020-04-27 | End: 2023-01-12

## 2023-01-12 RX ORDER — SEMAGLUTIDE 1.34 MG/ML
2 INJECTION, SOLUTION SUBCUTANEOUS
Qty: 1 | Refills: 3 | Status: COMPLETED | COMMUNITY
Start: 2022-11-07 | End: 2023-01-12

## 2023-01-12 NOTE — PHYSICAL EXAM
[Alert] : alert [Well Nourished] : well nourished [No Acute Distress] : no acute distress [Well Developed] : well developed [EOMI] : extra ocular movement intact [No Proptosis] : no proptosis [Normal Oropharynx] : the oropharynx was normal [Thyroid Not Enlarged] : the thyroid was not enlarged [No Thyroid Nodules] : no palpable thyroid nodules [No Respiratory Distress] : no respiratory distress [No Accessory Muscle Use] : no accessory muscle use [Clear to Auscultation] : lungs were clear to auscultation bilaterally [Normal S1, S2] : normal S1 and S2 [Normal Rate] : heart rate was normal [Regular Rhythm] : with a regular rhythm [No Edema] : no peripheral edema [Pedal Pulses Normal] : the pedal pulses are present [Normal Bowel Sounds] : normal bowel sounds [Not Tender] : non-tender [Not Distended] : not distended [Soft] : abdomen soft [No Stigmata of Cushings Syndrome] : no stigmata of Cushings Syndrome [Normal Gait] : normal gait [Normal Strength/Tone] : muscle strength and tone were normal [No Rash] : no rash [Acanthosis Nigricans] : no acanthosis nigricans [No Sensory Deficits] : the sensory exam was normal to light touch and pinprick [Normal Reflexes] : deep tendon reflexes were 2+ and symmetric [No Tremors] : no tremors [Normal Sensation on Monofilament Testing] : normal sensation on monofilament testing of lower extremities [Oriented x3] : oriented to person, place, and time [de-identified] : sclera pale

## 2023-01-12 NOTE — ASSESSMENT
[Diabetes Foot Care] : diabetes foot care [Long Term Vascular Complications] : long term vascular complications of diabetes [Carbohydrate Consistent Diet] : carbohydrate consistent diet [Importance of Diet and Exercise] : importance of diet and exercise to improve glycemic control, achieve weight loss and improve cardiovascular health [Hypoglycemia Management] : hypoglycemia management [Self Monitoring of Blood Glucose] : self monitoring of blood glucose [Retinopathy Screening] : Patient was referred to ophthalmology for retinopathy screening [Diabetic Medications] : Risks and benefits of diabetic medications were discussed [FreeTextEntry1] : The patient was well controlled type 2 diabetes, his current A1c is 5.3. We did discuss about compliance with his medications; continue Trulicity   increase to  3  mg weekly.  Mounjaro and Ozempic not covered.  Since can be doing better with weight will try again for Mounjaro 5 mg Since he is back on aspirin because of the TIA he should discuss this with his gastroenterologist and be followed closely for any bleeding.\par Has h/o low Na; Told to decrease his intake of fluids by one snapple bottle a day.\par Will check TFTs he has been stable on levothyroxine 75 ug. \par to do labs  fasting in am.

## 2023-01-16 ENCOUNTER — RX RENEWAL (OUTPATIENT)
Age: 63
End: 2023-01-16

## 2023-03-20 ENCOUNTER — RX RENEWAL (OUTPATIENT)
Age: 63
End: 2023-03-20

## 2023-04-27 ENCOUNTER — APPOINTMENT (OUTPATIENT)
Dept: ENDOCRINOLOGY | Facility: CLINIC | Age: 63
End: 2023-04-27
Payer: SELF-PAY

## 2023-04-27 VITALS
BODY MASS INDEX: 32.43 KG/M2 | SYSTOLIC BLOOD PRESSURE: 150 MMHG | HEART RATE: 59 BPM | DIASTOLIC BLOOD PRESSURE: 60 MMHG | HEIGHT: 70 IN | OXYGEN SATURATION: 99 % | WEIGHT: 226.5 LBS

## 2023-04-27 LAB
GLUCOSE BLDC GLUCOMTR-MCNC: 165
HBA1C MFR BLD HPLC: 5.2

## 2023-04-27 PROCEDURE — 99213 OFFICE O/P EST LOW 20 MIN: CPT | Mod: 25

## 2023-04-27 PROCEDURE — 83036 HEMOGLOBIN GLYCOSYLATED A1C: CPT | Mod: QW

## 2023-04-27 PROCEDURE — 82962 GLUCOSE BLOOD TEST: CPT

## 2023-04-27 RX ORDER — TIRZEPATIDE 5 MG/.5ML
5 INJECTION, SOLUTION SUBCUTANEOUS
Qty: 1 | Refills: 3 | Status: COMPLETED | COMMUNITY
Start: 2023-01-12 | End: 2023-04-27

## 2023-04-27 RX ORDER — DULAGLUTIDE 1.5 MG/.5ML
1.5 INJECTION, SOLUTION SUBCUTANEOUS
Qty: 4 | Refills: 1 | Status: DISCONTINUED | COMMUNITY
Start: 2022-11-07 | End: 2023-04-27

## 2023-04-27 NOTE — HISTORY OF PRESENT ILLNESS
[FreeTextEntry1] : Patient with a history of type 2 diabetes for many years. Prior to bariatric surgery he used to be on insulin, after that he had lost about 100 pounds and was maintained on oral agents. Currently he is on metformin 2000 mg a day and Trulicity  4.5  mg weekly.  has been losing weight.   He recently was told that he had a TIA and a few months ago he had a significant GI bleed. He is concerned about resuming aspirin. he has a h/o of hypothyroidism for many years; thyroid  sonogram was normal. He drinks a large amount of fluid daily and at times sodium levels have been in 120's. After cutting down liquid intake it improved. \par has not had Covid or the vaccine.

## 2023-04-27 NOTE — ASSESSMENT
[Diabetes Foot Care] : diabetes foot care [Long Term Vascular Complications] : long term vascular complications of diabetes [Carbohydrate Consistent Diet] : carbohydrate consistent diet [Importance of Diet and Exercise] : importance of diet and exercise to improve glycemic control, achieve weight loss and improve cardiovascular health [Self Monitoring of Blood Glucose] : self monitoring of blood glucose [Retinopathy Screening] : Patient was referred to ophthalmology for retinopathy screening [Diabetic Medications] : Risks and benefits of diabetic medications were discussed [Levothyroxine] : The patient was instructed to take Levothyroxine on an empty stomach, separate from vitamins, and wait at least 30 minutes before eating [FreeTextEntry1] : The patient was well controlled type 2 diabetes, his current A1c is 5.2. We did discuss about compliance with his medications; continue Trulicity   4.5  mg weekly.  Mounjaro and Ozempic not covered.  Since can be doing better with weight will try again for Mounjaro 5 mg Since he is back on aspirin because of the TIA he should discuss this with his gastroenterologist and be followed closely for any bleeding.\par Has h/o low Na; Told to decrease his intake of fluids by one snapple bottle a day.\par Will check TFTs he has been stable on levothyroxine 75 ug. \par to do labs  fasting in am.

## 2023-04-27 NOTE — PHYSICAL EXAM
[Alert] : alert [Well Nourished] : well nourished [No Acute Distress] : no acute distress [Well Developed] : well developed [EOMI] : extra ocular movement intact [No Proptosis] : no proptosis [Normal Oropharynx] : the oropharynx was normal [Thyroid Not Enlarged] : the thyroid was not enlarged [No Thyroid Nodules] : no palpable thyroid nodules [No Respiratory Distress] : no respiratory distress [No Accessory Muscle Use] : no accessory muscle use [Clear to Auscultation] : lungs were clear to auscultation bilaterally [Normal S1, S2] : normal S1 and S2 [Normal Rate] : heart rate was normal [Regular Rhythm] : with a regular rhythm [Pedal Pulses Normal] : the pedal pulses are present [No Edema] : no peripheral edema [Normal Bowel Sounds] : normal bowel sounds [Not Tender] : non-tender [Not Distended] : not distended [Soft] : abdomen soft [No Stigmata of Cushings Syndrome] : no stigmata of Cushings Syndrome [Normal Gait] : normal gait [Normal Strength/Tone] : muscle strength and tone were normal [No Rash] : no rash [Acanthosis Nigricans] : no acanthosis nigricans [No Sensory Deficits] : the sensory exam was normal to light touch and pinprick [Normal Reflexes] : deep tendon reflexes were 2+ and symmetric [No Tremors] : no tremors [Normal Sensation on Monofilament Testing] : normal sensation on monofilament testing of lower extremities [Oriented x3] : oriented to person, place, and time [de-identified] : sclera pale

## 2023-05-11 ENCOUNTER — RX RENEWAL (OUTPATIENT)
Age: 63
End: 2023-05-11

## 2023-06-26 ENCOUNTER — RX RENEWAL (OUTPATIENT)
Age: 63
End: 2023-06-26

## 2023-10-16 ENCOUNTER — RX RENEWAL (OUTPATIENT)
Age: 63
End: 2023-10-16

## 2023-10-23 ENCOUNTER — RX RENEWAL (OUTPATIENT)
Age: 63
End: 2023-10-23

## 2023-10-24 RX ORDER — PEN NEEDLE, DIABETIC 29 G X1/2"
31G X 5 MM NEEDLE, DISPOSABLE MISCELLANEOUS
Qty: 100 | Refills: 2 | Status: ACTIVE | COMMUNITY
Start: 2018-09-06 | End: 1900-01-01

## 2023-12-21 ENCOUNTER — APPOINTMENT (OUTPATIENT)
Dept: ENDOCRINOLOGY | Facility: CLINIC | Age: 63
End: 2023-12-21
Payer: SELF-PAY

## 2023-12-21 VITALS
DIASTOLIC BLOOD PRESSURE: 71 MMHG | BODY MASS INDEX: 29.99 KG/M2 | WEIGHT: 209.5 LBS | OXYGEN SATURATION: 97 % | HEART RATE: 63 BPM | HEIGHT: 70 IN | SYSTOLIC BLOOD PRESSURE: 117 MMHG

## 2023-12-21 DIAGNOSIS — G56.21 LESION OF ULNAR NERVE, RIGHT UPPER LIMB: ICD-10-CM

## 2023-12-21 DIAGNOSIS — E04.9 NONTOXIC GOITER, UNSPECIFIED: ICD-10-CM

## 2023-12-21 LAB
GLUCOSE BLDC GLUCOMTR-MCNC: 99
HBA1C MFR BLD HPLC: 5.1

## 2023-12-21 PROCEDURE — 82962 GLUCOSE BLOOD TEST: CPT

## 2023-12-21 PROCEDURE — 83036 HEMOGLOBIN GLYCOSYLATED A1C: CPT | Mod: QW

## 2023-12-21 PROCEDURE — 99213 OFFICE O/P EST LOW 20 MIN: CPT | Mod: 25

## 2023-12-21 NOTE — PHYSICAL EXAM
[Alert] : alert [Well Nourished] : well nourished [No Acute Distress] : no acute distress [Well Developed] : well developed [EOMI] : extra ocular movement intact [No Proptosis] : no proptosis [Normal Oropharynx] : the oropharynx was normal [Thyroid Not Enlarged] : the thyroid was not enlarged [No Thyroid Nodules] : no palpable thyroid nodules [No Respiratory Distress] : no respiratory distress [No Accessory Muscle Use] : no accessory muscle use [Clear to Auscultation] : lungs were clear to auscultation bilaterally [Normal S1, S2] : normal S1 and S2 [Normal Rate] : heart rate was normal [Regular Rhythm] : with a regular rhythm [No Edema] : no peripheral edema [Pedal Pulses Normal] : the pedal pulses are present [Normal Bowel Sounds] : normal bowel sounds [Not Tender] : non-tender [Not Distended] : not distended [Soft] : abdomen soft [No Stigmata of Cushings Syndrome] : no stigmata of Cushings Syndrome [Normal Gait] : normal gait [Normal Strength/Tone] : muscle strength and tone were normal [No Rash] : no rash [Acanthosis Nigricans] : no acanthosis nigricans [No Sensory Deficits] : the sensory exam was normal to light touch and pinprick [Normal Reflexes] : deep tendon reflexes were 2+ and symmetric [No Tremors] : no tremors [Normal Sensation on Monofilament Testing] : normal sensation on monofilament testing of lower extremities [Oriented x3] : oriented to person, place, and time [de-identified] : sclera pale

## 2023-12-21 NOTE — ASSESSMENT
[Diabetes Foot Care] : diabetes foot care [Long Term Vascular Complications] : long term vascular complications of diabetes [Carbohydrate Consistent Diet] : carbohydrate consistent diet [Importance of Diet and Exercise] : importance of diet and exercise to improve glycemic control, achieve weight loss and improve cardiovascular health [Self Monitoring of Blood Glucose] : self monitoring of blood glucose [Retinopathy Screening] : Patient was referred to ophthalmology for retinopathy screening [Diabetic Medications] : Risks and benefits of diabetic medications were discussed [Levothyroxine] : The patient was instructed to take Levothyroxine on an empty stomach, separate from vitamins, and wait at least 30 minutes before eating [FreeTextEntry1] : The patient was well controlled type 2 diabetes, his current A1c is 5.1. We did discuss about compliance with his medications; continue Trulicity 4.5 mg weekly. He is back on aspirin because of the TIA he should discuss this with his gastroenterologist and be followed closely for any bleeding. Now getting iron infusion from hematologist.   Has h/o low Na; Told to decrease his intake of fluids by one Snapple bottle a day.  Will check TFTs he has been stable on levothyroxine 75 ug.  to do labs fasting in am.

## 2023-12-21 NOTE — HISTORY OF PRESENT ILLNESS
[FreeTextEntry1] : Patient with a history of type 2 diabetes for many years. Prior to bariatric surgery he used to be on insulin, after that he had lost about 100 pounds and was maintained on oral agents. Currently he is on metformin 2000 mg a day and Trulicity  4.5  mg weekly.  has been losing weight.   He recently was told that he had a TIA and a few months ago he had a significant GI bleed. He is concerned about resuming aspirin. he has a h/o of hypothyroidism for many years; thyroid  sonogram was normal. He drinks a large amount of fluid daily and at times sodium levels have been in 120's. After cutting down liquid intake it improved.  has not had Covid or the vaccine.

## 2024-01-08 ENCOUNTER — RX RENEWAL (OUTPATIENT)
Age: 64
End: 2024-01-08

## 2024-01-28 ENCOUNTER — RX RENEWAL (OUTPATIENT)
Age: 64
End: 2024-01-28

## 2024-02-06 ENCOUNTER — APPOINTMENT (OUTPATIENT)
Dept: ENDOCRINOLOGY | Facility: CLINIC | Age: 64
End: 2024-02-06

## 2024-04-08 ENCOUNTER — RX RENEWAL (OUTPATIENT)
Age: 64
End: 2024-04-08

## 2024-04-16 ENCOUNTER — APPOINTMENT (OUTPATIENT)
Dept: ENDOCRINOLOGY | Facility: CLINIC | Age: 64
End: 2024-04-16
Payer: SELF-PAY

## 2024-04-16 VITALS
SYSTOLIC BLOOD PRESSURE: 120 MMHG | BODY MASS INDEX: 30.64 KG/M2 | OXYGEN SATURATION: 100 % | HEART RATE: 58 BPM | HEIGHT: 70 IN | WEIGHT: 214 LBS | DIASTOLIC BLOOD PRESSURE: 74 MMHG

## 2024-04-16 DIAGNOSIS — E66.9 OBESITY, UNSPECIFIED: ICD-10-CM

## 2024-04-16 DIAGNOSIS — E78.5 HYPERLIPIDEMIA, UNSPECIFIED: ICD-10-CM

## 2024-04-16 DIAGNOSIS — D64.9 ANEMIA, UNSPECIFIED: ICD-10-CM

## 2024-04-16 DIAGNOSIS — E87.1 HYPO-OSMOLALITY AND HYPONATREMIA: ICD-10-CM

## 2024-04-16 DIAGNOSIS — E03.9 HYPOTHYROIDISM, UNSPECIFIED: ICD-10-CM

## 2024-04-16 DIAGNOSIS — E11.9 TYPE 2 DIABETES MELLITUS W/OUT COMPLICATIONS: ICD-10-CM

## 2024-04-16 DIAGNOSIS — Z86.39 PERSONAL HISTORY OF OTHER ENDOCRINE, NUTRITIONAL AND METABOLIC DISEASE: ICD-10-CM

## 2024-04-16 DIAGNOSIS — I10 ESSENTIAL (PRIMARY) HYPERTENSION: ICD-10-CM

## 2024-04-16 LAB
GLUCOSE BLDC GLUCOMTR-MCNC: 93
HBA1C MFR BLD HPLC: 5.5

## 2024-04-16 PROCEDURE — 99214 OFFICE O/P EST MOD 30 MIN: CPT | Mod: 25

## 2024-04-16 PROCEDURE — 83036 HEMOGLOBIN GLYCOSYLATED A1C: CPT | Mod: QW

## 2024-04-16 PROCEDURE — 82962 GLUCOSE BLOOD TEST: CPT

## 2024-04-16 RX ORDER — TIRZEPATIDE 5 MG/.5ML
5 INJECTION, SOLUTION SUBCUTANEOUS
Qty: 1 | Refills: 2 | Status: ACTIVE | COMMUNITY
Start: 2024-04-16 | End: 1900-01-01

## 2024-04-16 NOTE — PHYSICAL EXAM
[Alert] : alert [Well Nourished] : well nourished [No Acute Distress] : no acute distress [Well Developed] : well developed [EOMI] : extra ocular movement intact [No Proptosis] : no proptosis [Normal Oropharynx] : the oropharynx was normal [Thyroid Not Enlarged] : the thyroid was not enlarged [No Thyroid Nodules] : no palpable thyroid nodules [No Respiratory Distress] : no respiratory distress [No Accessory Muscle Use] : no accessory muscle use [Clear to Auscultation] : lungs were clear to auscultation bilaterally [Normal S1, S2] : normal S1 and S2 [Normal Rate] : heart rate was normal [Regular Rhythm] : with a regular rhythm [No Edema] : no peripheral edema [Pedal Pulses Normal] : the pedal pulses are present [Normal Bowel Sounds] : normal bowel sounds [Not Tender] : non-tender [Not Distended] : not distended [Soft] : abdomen soft [No Stigmata of Cushings Syndrome] : no stigmata of Cushings Syndrome [Normal Gait] : normal gait [Normal Strength/Tone] : muscle strength and tone were normal [No Rash] : no rash [No Sensory Deficits] : the sensory exam was normal to light touch and pinprick [Normal Reflexes] : deep tendon reflexes were 2+ and symmetric [No Tremors] : no tremors [Normal Sensation on Monofilament Testing] : normal sensation on monofilament testing of lower extremities [Oriented x3] : oriented to person, place, and time [Acanthosis Nigricans] : no acanthosis nigricans [de-identified] : sclera pale

## 2024-04-16 NOTE — ASSESSMENT
[Diabetes Foot Care] : diabetes foot care [Long Term Vascular Complications] : long term vascular complications of diabetes [Carbohydrate Consistent Diet] : carbohydrate consistent diet [Importance of Diet and Exercise] : importance of diet and exercise to improve glycemic control, achieve weight loss and improve cardiovascular health [Self Monitoring of Blood Glucose] : self monitoring of blood glucose [Retinopathy Screening] : Patient was referred to ophthalmology for retinopathy screening [Diabetic Medications] : Risks and benefits of diabetic medications were discussed [Levothyroxine] : The patient was instructed to take Levothyroxine on an empty stomach, separate from vitamins, and wait at least 30 minutes before eating [FreeTextEntry1] : The patient was well controlled type 2 diabetes, his current A1c is 5.5. We did discuss about compliance with his medications; continue Trulicity 4.5 mg weekly. He is back on aspirin because of the TIA he should discuss this with his gastroenterologist and be followed closely for any bleeding. Now getting iron infusion from hematologist.  Feels at this point his weight has stabilized and since his BMI still above 30 we will order Zepbound to replace Trulicity.  He understands and will await to see if his insurance covers it.  He realizes he will need to be titrated up we will start him on 5 mg dose.  Has h/o low Na; Told to decrease his intake of fluids by one Snapple bottle a day.  Will check TFTs he has been stable on levothyroxine 75 ug.  Was supposed to skip 2 pills a month because TSH was suppressed but has not been doing that.  to do labs fasting in am.

## 2024-07-01 ENCOUNTER — RX RENEWAL (OUTPATIENT)
Age: 64
End: 2024-07-01

## 2024-07-25 RX ORDER — TIRZEPATIDE 5 MG/.5ML
5 INJECTION, SOLUTION SUBCUTANEOUS
Qty: 1 | Refills: 5 | Status: ACTIVE | COMMUNITY
Start: 2024-07-25 | End: 1900-01-01

## 2024-08-27 ENCOUNTER — APPOINTMENT (OUTPATIENT)
Dept: ENDOCRINOLOGY | Facility: CLINIC | Age: 64
End: 2024-08-27

## 2024-09-17 ENCOUNTER — APPOINTMENT (OUTPATIENT)
Dept: ENDOCRINOLOGY | Facility: CLINIC | Age: 64
End: 2024-09-17
Payer: MEDICARE

## 2024-09-17 LAB — HBA1C MFR BLD HPLC: 5.1

## 2024-09-17 PROCEDURE — 36415 COLL VENOUS BLD VENIPUNCTURE: CPT

## 2024-09-19 ENCOUNTER — APPOINTMENT (OUTPATIENT)
Dept: ENDOCRINOLOGY | Facility: CLINIC | Age: 64
End: 2024-09-19
Payer: MEDICARE

## 2024-09-19 VITALS
OXYGEN SATURATION: 99 % | HEART RATE: 79 BPM | HEIGHT: 70 IN | DIASTOLIC BLOOD PRESSURE: 76 MMHG | SYSTOLIC BLOOD PRESSURE: 136 MMHG

## 2024-09-19 VITALS — WEIGHT: 218 LBS | BODY MASS INDEX: 31.28 KG/M2

## 2024-09-19 DIAGNOSIS — E87.1 HYPO-OSMOLALITY AND HYPONATREMIA: ICD-10-CM

## 2024-09-19 DIAGNOSIS — D64.9 ANEMIA, UNSPECIFIED: ICD-10-CM

## 2024-09-19 DIAGNOSIS — I10 ESSENTIAL (PRIMARY) HYPERTENSION: ICD-10-CM

## 2024-09-19 DIAGNOSIS — E11.9 TYPE 2 DIABETES MELLITUS W/OUT COMPLICATIONS: ICD-10-CM

## 2024-09-19 DIAGNOSIS — E78.5 HYPERLIPIDEMIA, UNSPECIFIED: ICD-10-CM

## 2024-09-19 DIAGNOSIS — E03.9 HYPOTHYROIDISM, UNSPECIFIED: ICD-10-CM

## 2024-09-19 LAB — GLUCOSE BLDC GLUCOMTR-MCNC: 218

## 2024-09-19 PROCEDURE — 82962 GLUCOSE BLOOD TEST: CPT

## 2024-09-19 PROCEDURE — 83036 HEMOGLOBIN GLYCOSYLATED A1C: CPT | Mod: QW

## 2024-09-19 PROCEDURE — 99214 OFFICE O/P EST MOD 30 MIN: CPT | Mod: 25

## 2024-09-19 RX ORDER — BUPROPION HYDROCHLORIDE 100 MG/1
100 TABLET, FILM COATED ORAL DAILY
Qty: 90 | Refills: 0 | Status: ACTIVE | COMMUNITY
Start: 2024-09-19

## 2024-09-19 RX ORDER — TIRZEPATIDE 7.5 MG/.5ML
7.5 INJECTION, SOLUTION SUBCUTANEOUS
Qty: 1 | Refills: 3 | Status: ACTIVE | COMMUNITY
Start: 2024-09-19 | End: 1900-01-01

## 2024-09-19 NOTE — PHYSICAL EXAM
[Alert] : alert [Well Nourished] : well nourished [No Acute Distress] : no acute distress [Well Developed] : well developed [EOMI] : extra ocular movement intact [No Proptosis] : no proptosis [Normal Oropharynx] : the oropharynx was normal [Thyroid Not Enlarged] : the thyroid was not enlarged [No Thyroid Nodules] : no palpable thyroid nodules [No Respiratory Distress] : no respiratory distress [No Accessory Muscle Use] : no accessory muscle use [Clear to Auscultation] : lungs were clear to auscultation bilaterally [Normal S1, S2] : normal S1 and S2 [Normal Rate] : heart rate was normal [Regular Rhythm] : with a regular rhythm [No Edema] : no peripheral edema [Pedal Pulses Normal] : the pedal pulses are present [Normal Bowel Sounds] : normal bowel sounds [Not Tender] : non-tender [Not Distended] : not distended [Soft] : abdomen soft [No Stigmata of Cushings Syndrome] : no stigmata of Cushings Syndrome [Normal Gait] : normal gait [Normal Strength/Tone] : muscle strength and tone were normal [No Rash] : no rash [No Sensory Deficits] : the sensory exam was normal to light touch and pinprick [Normal Reflexes] : deep tendon reflexes were 2+ and symmetric [No Tremors] : no tremors [Normal Sensation on Monofilament Testing] : normal sensation on monofilament testing of lower extremities [Oriented x3] : oriented to person, place, and time [Acanthosis Nigricans] : no acanthosis nigricans [de-identified] : sclera pale

## 2024-09-19 NOTE — ASSESSMENT
[Diabetes Foot Care] : diabetes foot care [Long Term Vascular Complications] : long term vascular complications of diabetes [Carbohydrate Consistent Diet] : carbohydrate consistent diet [Importance of Diet and Exercise] : importance of diet and exercise to improve glycemic control, achieve weight loss and improve cardiovascular health [Hypoglycemia Management] : hypoglycemia management [Self Monitoring of Blood Glucose] : self monitoring of blood glucose [Retinopathy Screening] : Patient was referred to ophthalmology for retinopathy screening [Diabetic Medications] : Risks and benefits of diabetic medications were discussed [Levothyroxine] : The patient was instructed to take Levothyroxine on an empty stomach, separate from vitamins, and wait at least 30 minutes before eating [FreeTextEntry1] : The patient was well controlled type 2 diabetes, his current A1c is 5.5. We did discuss about compliance with his medications; continue Mounjaro but will now increase to 7.5 mg weekly.  He is back on aspirin because of the TIA he should discuss this with his gastroenterologist and be followed closely for any bleeding. Now getting iron infusion from hematologist.  Has follow-up next week and has been feeling lightheaded. Has h/o low Na; Told to decrease his intake of fluids by one Snapple bottle a day.  Says still drinking 3 to liter bottles of Diet Coke a day.  He will try to cut back.  Most recent labs at PCP may not be real since that showed his A1c to be greater than 14 when it is actually 5.1.  Will check TFTs he has been stable on levothyroxine 75 ug.  Was supposed to skip 2 pills a month because TSH was suppressed but has not been doing that.  to do labs fasting in am.

## 2024-09-19 NOTE — HISTORY OF PRESENT ILLNESS
[FreeTextEntry1] : Patient with a history of type 2 diabetes for many years. Prior to bariatric surgery he used to be on insulin, after that he had lost about 100 pounds and was maintained on oral agents. Currently he is on metformin 2000 mg a day and  mounjaro 5 mg off Trulicity  4.5  .    He recently was told that he had a TIA and a few months ago he had a significant GI bleed. He is concerned about resuming aspirin. he has a h/o of hypothyroidism for many years; thyroid sonogram was normal. He drinks a large amount of fluid daily and at times sodium levels have been in 120's. After cutting down liquid intake it improved.  has not had Covid or the vaccine.  He is currently on 2 antidepressants Lexapro and Buspar.

## 2024-09-20 ENCOUNTER — APPOINTMENT (OUTPATIENT)
Dept: ENDOCRINOLOGY | Facility: CLINIC | Age: 64
End: 2024-09-20
Payer: MEDICARE

## 2024-09-20 LAB
ALBUMIN SERPL ELPH-MCNC: 4.5 G/DL
ALP BLD-CCNC: 99 U/L
ALT SERPL-CCNC: 18 U/L
ANION GAP SERPL CALC-SCNC: 12 MMOL/L
APPEARANCE: CLEAR
AST SERPL-CCNC: 27 U/L
BILIRUB SERPL-MCNC: 0.6 MG/DL
BILIRUBIN URINE: NEGATIVE
BLOOD URINE: NEGATIVE
BUN SERPL-MCNC: 10 MG/DL
CALCIUM SERPL-MCNC: 9.6 MG/DL
CHLORIDE SERPL-SCNC: 94 MMOL/L
CHOLEST SERPL-MCNC: 129 MG/DL
CO2 SERPL-SCNC: 24 MMOL/L
COLOR: YELLOW
CREAT SERPL-MCNC: 0.98 MG/DL
CREAT SPEC-SCNC: 86 MG/DL
EGFR: 86 ML/MIN/1.73M2
GLUCOSE QUALITATIVE U: NEGATIVE MG/DL
GLUCOSE SERPL-MCNC: 84 MG/DL
HDLC SERPL-MCNC: 74 MG/DL
KETONES URINE: NEGATIVE MG/DL
LDLC SERPL CALC-MCNC: 41 MG/DL
LEUKOCYTE ESTERASE URINE: NEGATIVE
MICROALBUMIN 24H UR DL<=1MG/L-MCNC: 4.7 MG/DL
MICROALBUMIN/CREAT 24H UR-RTO: 55 MG/G
NITRITE URINE: NEGATIVE
NONHDLC SERPL-MCNC: 55 MG/DL
OSMOLALITY SERPL: 267 MOSMOL/KG
OSMOLALITY UR: 267 MOSM/KG
PH URINE: 7.5
POTASSIUM SERPL-SCNC: 5 MMOL/L
PROT SERPL-MCNC: 7.4 G/DL
PROTEIN URINE: NORMAL MG/DL
SODIUM SERPL-SCNC: 129 MMOL/L
SPECIFIC GRAVITY URINE: 1.01
T4 FREE SERPL-MCNC: 1.5 NG/DL
T4 SERPL-MCNC: 9.6 UG/DL
TRIGL SERPL-MCNC: 66 MG/DL
TSH SERPL-ACNC: 0.27 UIU/ML
UROBILINOGEN URINE: 1 MG/DL

## 2024-09-20 PROCEDURE — 36415 COLL VENOUS BLD VENIPUNCTURE: CPT

## 2024-09-30 ENCOUNTER — RX RENEWAL (OUTPATIENT)
Age: 64
End: 2024-09-30

## 2024-10-08 ENCOUNTER — NON-APPOINTMENT (OUTPATIENT)
Age: 64
End: 2024-10-08

## 2024-12-30 ENCOUNTER — LABORATORY RESULT (OUTPATIENT)
Age: 64
End: 2024-12-30

## 2024-12-30 ENCOUNTER — RX RENEWAL (OUTPATIENT)
Age: 64
End: 2024-12-30

## 2024-12-30 ENCOUNTER — APPOINTMENT (OUTPATIENT)
Dept: ENDOCRINOLOGY | Facility: CLINIC | Age: 64
End: 2024-12-30
Payer: MEDICARE

## 2024-12-30 VITALS
DIASTOLIC BLOOD PRESSURE: 75 MMHG | HEART RATE: 57 BPM | WEIGHT: 202 LBS | HEIGHT: 70 IN | BODY MASS INDEX: 28.92 KG/M2 | OXYGEN SATURATION: 99 % | SYSTOLIC BLOOD PRESSURE: 127 MMHG

## 2024-12-30 DIAGNOSIS — E03.9 HYPOTHYROIDISM, UNSPECIFIED: ICD-10-CM

## 2024-12-30 DIAGNOSIS — E11.9 TYPE 2 DIABETES MELLITUS W/OUT COMPLICATIONS: ICD-10-CM

## 2024-12-30 DIAGNOSIS — E87.1 HYPO-OSMOLALITY AND HYPONATREMIA: ICD-10-CM

## 2024-12-30 DIAGNOSIS — D64.9 ANEMIA, UNSPECIFIED: ICD-10-CM

## 2024-12-30 LAB — HBA1C MFR BLD HPLC: 5.1

## 2024-12-30 PROCEDURE — 99214 OFFICE O/P EST MOD 30 MIN: CPT

## 2024-12-30 PROCEDURE — 36415 COLL VENOUS BLD VENIPUNCTURE: CPT

## 2024-12-30 PROCEDURE — 83036 HEMOGLOBIN GLYCOSYLATED A1C: CPT | Mod: QW

## 2024-12-30 RX ORDER — TIRZEPATIDE 7.5 MG/.5ML
7.5 INJECTION, SOLUTION SUBCUTANEOUS
Qty: 1 | Refills: 3 | Status: ACTIVE | COMMUNITY
Start: 2024-12-30 | End: 1900-01-01

## 2024-12-31 LAB
ALBUMIN SERPL ELPH-MCNC: 4.4 G/DL
ALP BLD-CCNC: 79 U/L
ALT SERPL-CCNC: 16 U/L
ANION GAP SERPL CALC-SCNC: 11 MMOL/L
APPEARANCE: CLEAR
AST SERPL-CCNC: 22 U/L
BASOPHILS # BLD AUTO: 0.04 K/UL
BASOPHILS NFR BLD AUTO: 0.7 %
BILIRUB SERPL-MCNC: 0.4 MG/DL
BILIRUBIN URINE: NEGATIVE
BLOOD URINE: NEGATIVE
BUN SERPL-MCNC: 7 MG/DL
CALCIUM SERPL-MCNC: 9.3 MG/DL
CHLORIDE SERPL-SCNC: 95 MMOL/L
CHOLEST SERPL-MCNC: 127 MG/DL
CO2 SERPL-SCNC: 25 MMOL/L
COLOR: YELLOW
CREAT SERPL-MCNC: 0.83 MG/DL
CREAT SPEC-SCNC: 54 MG/DL
EGFR: 97 ML/MIN/1.73M2
EOSINOPHIL # BLD AUTO: 0.15 K/UL
EOSINOPHIL NFR BLD AUTO: 2.5 %
ESTIMATED AVERAGE GLUCOSE: 100 MG/DL
FERRITIN SERPL-MCNC: 13 NG/ML
FOLATE SERPL-MCNC: >20 NG/ML
GLUCOSE QUALITATIVE U: NEGATIVE MG/DL
GLUCOSE SERPL-MCNC: 60 MG/DL
HBA1C MFR BLD HPLC: 5.1 %
HCT VFR BLD CALC: 40.2 %
HDLC SERPL-MCNC: 81 MG/DL
HGB BLD-MCNC: 12.9 G/DL
IMM GRANULOCYTES NFR BLD AUTO: 0.3 %
IRON SATN MFR SERPL: 10 %
IRON SERPL-MCNC: 42 UG/DL
KETONES URINE: NEGATIVE MG/DL
LDLC SERPL CALC-MCNC: 36 MG/DL
LEUKOCYTE ESTERASE URINE: NEGATIVE
LYMPHOCYTES # BLD AUTO: 1.57 K/UL
LYMPHOCYTES NFR BLD AUTO: 26.3 %
MAN DIFF?: NORMAL
MCHC RBC-ENTMCNC: 28.2 PG
MCHC RBC-ENTMCNC: 32.1 G/DL
MCV RBC AUTO: 88 FL
MICROALBUMIN 24H UR DL<=1MG/L-MCNC: 2.6 MG/DL
MICROALBUMIN/CREAT 24H UR-RTO: 47 MG/G
MONOCYTES # BLD AUTO: 0.77 K/UL
MONOCYTES NFR BLD AUTO: 12.9 %
NEUTROPHILS # BLD AUTO: 3.42 K/UL
NEUTROPHILS NFR BLD AUTO: 57.3 %
NITRITE URINE: NEGATIVE
NONHDLC SERPL-MCNC: 46 MG/DL
PH URINE: 7
PLATELET # BLD AUTO: 195 K/UL
POTASSIUM SERPL-SCNC: 4.3 MMOL/L
PROT SERPL-MCNC: 6.8 G/DL
PROTEIN URINE: 30 MG/DL
RBC # BLD: 4.57 M/UL
RBC # FLD: 18.6 %
SODIUM SERPL-SCNC: 131 MMOL/L
SPECIFIC GRAVITY URINE: 1.01
T4 FREE SERPL-MCNC: 1.3 NG/DL
T4 SERPL-MCNC: 8 UG/DL
TIBC SERPL-MCNC: 437 UG/DL
TRIGL SERPL-MCNC: 43 MG/DL
TSH SERPL-ACNC: 1.47 UIU/ML
UIBC SERPL-MCNC: 396 UG/DL
UROBILINOGEN URINE: 1 MG/DL
VIT B12 SERPL-MCNC: 572 PG/ML
WBC # FLD AUTO: 5.97 K/UL

## 2025-01-28 RX ORDER — LANCETS 28 GAUGE
EACH MISCELLANEOUS
Qty: 1 | Refills: 3 | Status: ACTIVE | COMMUNITY
Start: 2025-01-28 | End: 1900-01-01

## 2025-03-20 ENCOUNTER — APPOINTMENT (OUTPATIENT)
Dept: ENDOCRINOLOGY | Facility: CLINIC | Age: 65
End: 2025-03-20
Payer: MEDICARE

## 2025-03-20 VITALS
HEIGHT: 70 IN | SYSTOLIC BLOOD PRESSURE: 138 MMHG | HEART RATE: 55 BPM | OXYGEN SATURATION: 99 % | DIASTOLIC BLOOD PRESSURE: 77 MMHG | WEIGHT: 192 LBS | BODY MASS INDEX: 27.49 KG/M2

## 2025-03-20 DIAGNOSIS — E87.1 HYPO-OSMOLALITY AND HYPONATREMIA: ICD-10-CM

## 2025-03-20 DIAGNOSIS — E03.9 HYPOTHYROIDISM, UNSPECIFIED: ICD-10-CM

## 2025-03-20 DIAGNOSIS — E66.9 OBESITY, UNSPECIFIED: ICD-10-CM

## 2025-03-20 DIAGNOSIS — I10 ESSENTIAL (PRIMARY) HYPERTENSION: ICD-10-CM

## 2025-03-20 DIAGNOSIS — E11.9 TYPE 2 DIABETES MELLITUS W/OUT COMPLICATIONS: ICD-10-CM

## 2025-03-20 DIAGNOSIS — E78.5 HYPERLIPIDEMIA, UNSPECIFIED: ICD-10-CM

## 2025-03-20 DIAGNOSIS — D64.9 ANEMIA, UNSPECIFIED: ICD-10-CM

## 2025-03-20 LAB
GLUCOSE BLDC GLUCOMTR-MCNC: 76
HBA1C MFR BLD HPLC: 4.7

## 2025-03-20 PROCEDURE — 99214 OFFICE O/P EST MOD 30 MIN: CPT

## 2025-03-20 PROCEDURE — 83036 HEMOGLOBIN GLYCOSYLATED A1C: CPT | Mod: QW

## 2025-03-20 PROCEDURE — 82962 GLUCOSE BLOOD TEST: CPT

## 2025-03-20 PROCEDURE — 36415 COLL VENOUS BLD VENIPUNCTURE: CPT

## 2025-03-24 LAB
25(OH)D3 SERPL-MCNC: 47 NG/ML
ALBUMIN SERPL ELPH-MCNC: 4.5 G/DL
ALP BLD-CCNC: 79 U/L
ALT SERPL-CCNC: 16 U/L
ANION GAP SERPL CALC-SCNC: 12 MMOL/L
AST SERPL-CCNC: 23 U/L
BASOPHILS # BLD AUTO: 0.05 K/UL
BASOPHILS NFR BLD AUTO: 1.3 %
BILIRUB SERPL-MCNC: 0.5 MG/DL
BUN SERPL-MCNC: 8 MG/DL
CALCIUM SERPL-MCNC: 9.7 MG/DL
CHLORIDE SERPL-SCNC: 93 MMOL/L
CHOLEST SERPL-MCNC: 134 MG/DL
CO2 SERPL-SCNC: 26 MMOL/L
CREAT SERPL-MCNC: 0.86 MG/DL
CREAT SPEC-SCNC: 48 MG/DL
EGFRCR SERPLBLD CKD-EPI 2021: 96 ML/MIN/1.73M2
EOSINOPHIL # BLD AUTO: 0.14 K/UL
EOSINOPHIL NFR BLD AUTO: 3.6 %
ESTIMATED AVERAGE GLUCOSE: 94 MG/DL
GLUCOSE SERPL-MCNC: 72 MG/DL
HBA1C MFR BLD HPLC: 4.9 %
HCT VFR BLD CALC: 41.8 %
HDLC SERPL-MCNC: 81 MG/DL
HGB BLD-MCNC: 13.9 G/DL
IMM GRANULOCYTES NFR BLD AUTO: 0.3 %
LDLC SERPL-MCNC: 42 MG/DL
LYMPHOCYTES # BLD AUTO: 1.02 K/UL
LYMPHOCYTES NFR BLD AUTO: 25.9 %
MAN DIFF?: NORMAL
MCHC RBC-ENTMCNC: 28.7 PG
MCHC RBC-ENTMCNC: 33.3 G/DL
MCV RBC AUTO: 86.2 FL
MICROALBUMIN 24H UR DL<=1MG/L-MCNC: 2.3 MG/DL
MICROALBUMIN/CREAT 24H UR-RTO: 48 MG/G
MONOCYTES # BLD AUTO: 0.52 K/UL
MONOCYTES NFR BLD AUTO: 13.2 %
NEUTROPHILS # BLD AUTO: 2.2 K/UL
NEUTROPHILS NFR BLD AUTO: 55.7 %
NONHDLC SERPL-MCNC: 53 MG/DL
PLATELET # BLD AUTO: 249 K/UL
POTASSIUM SERPL-SCNC: 5.1 MMOL/L
PROT SERPL-MCNC: 6.9 G/DL
RBC # BLD: 4.85 M/UL
RBC # FLD: 15.9 %
SODIUM SERPL-SCNC: 131 MMOL/L
T4 FREE SERPL-MCNC: 1.4 NG/DL
T4 SERPL-MCNC: 9.1 UG/DL
TRIGL SERPL-MCNC: 48 MG/DL
TSH SERPL-ACNC: 2.29 UIU/ML
WBC # FLD AUTO: 3.94 K/UL

## 2025-03-27 ENCOUNTER — APPOINTMENT (OUTPATIENT)
Dept: ENDOCRINOLOGY | Facility: CLINIC | Age: 65
End: 2025-03-27

## 2025-03-31 ENCOUNTER — APPOINTMENT (OUTPATIENT)
Dept: ENDOCRINOLOGY | Facility: CLINIC | Age: 65
End: 2025-03-31
Payer: MEDICARE

## 2025-03-31 PROCEDURE — 36415 COLL VENOUS BLD VENIPUNCTURE: CPT

## 2025-04-01 ENCOUNTER — NON-APPOINTMENT (OUTPATIENT)
Age: 65
End: 2025-04-01

## 2025-04-01 DIAGNOSIS — E04.9 NONTOXIC GOITER, UNSPECIFIED: ICD-10-CM

## 2025-04-01 LAB
FERRITIN SERPL-MCNC: 16 NG/ML
FOLATE SERPL-MCNC: >20 NG/ML
IRON SATN MFR SERPL: 9 %
IRON SERPL-MCNC: 40 UG/DL
TIBC SERPL-MCNC: 469 UG/DL
UIBC SERPL-MCNC: 429 UG/DL
VIT B12 SERPL-MCNC: 759 PG/ML

## 2025-06-24 ENCOUNTER — APPOINTMENT (OUTPATIENT)
Dept: ENDOCRINOLOGY | Facility: CLINIC | Age: 65
End: 2025-06-24
Payer: MEDICARE

## 2025-06-24 VITALS
DIASTOLIC BLOOD PRESSURE: 78 MMHG | SYSTOLIC BLOOD PRESSURE: 168 MMHG | BODY MASS INDEX: 28.35 KG/M2 | HEIGHT: 70 IN | HEART RATE: 60 BPM | OXYGEN SATURATION: 99 % | WEIGHT: 198 LBS

## 2025-06-24 VITALS — DIASTOLIC BLOOD PRESSURE: 80 MMHG | SYSTOLIC BLOOD PRESSURE: 148 MMHG

## 2025-06-24 LAB
GLUCOSE BLDC GLUCOMTR-MCNC: 74
HBA1C MFR BLD HPLC: 4.9

## 2025-06-24 PROCEDURE — 36415 COLL VENOUS BLD VENIPUNCTURE: CPT

## 2025-06-24 PROCEDURE — 82962 GLUCOSE BLOOD TEST: CPT

## 2025-06-24 PROCEDURE — 99214 OFFICE O/P EST MOD 30 MIN: CPT

## 2025-06-24 PROCEDURE — G2211 COMPLEX E/M VISIT ADD ON: CPT

## 2025-06-24 PROCEDURE — 83036 HEMOGLOBIN GLYCOSYLATED A1C: CPT | Mod: QW

## 2025-06-25 LAB
ALBUMIN SERPL ELPH-MCNC: 4.2 G/DL
ALP BLD-CCNC: 91 U/L
ALT SERPL-CCNC: 36 U/L
ANION GAP SERPL CALC-SCNC: 13 MMOL/L
APPEARANCE: CLEAR
AST SERPL-CCNC: 32 U/L
BASOPHILS # BLD AUTO: 0.05 K/UL
BASOPHILS NFR BLD AUTO: 1 %
BILIRUB SERPL-MCNC: 0.5 MG/DL
BILIRUBIN URINE: NEGATIVE
BLOOD URINE: NEGATIVE
BUN SERPL-MCNC: 10 MG/DL
CALCIUM SERPL-MCNC: 9.3 MG/DL
CHLORIDE SERPL-SCNC: 93 MMOL/L
CHOLEST SERPL-MCNC: 134 MG/DL
CO2 SERPL-SCNC: 24 MMOL/L
COLOR: YELLOW
CORTISOL: 11.2 UG/DL
CREAT SERPL-MCNC: 0.81 MG/DL
CREAT SPEC-SCNC: 34 MG/DL
EGFRCR SERPLBLD CKD-EPI 2021: 98 ML/MIN/1.73M2
EOSINOPHIL # BLD AUTO: 0.34 K/UL
EOSINOPHIL NFR BLD AUTO: 6.7 %
FERRITIN SERPL-MCNC: 12 NG/ML
FOLATE SERPL-MCNC: >20 NG/ML
GLUCOSE QUALITATIVE U: NEGATIVE MG/DL
GLUCOSE SERPL-MCNC: 86 MG/DL
HCT VFR BLD CALC: 40.4 %
HDLC SERPL-MCNC: 82 MG/DL
HGB BLD-MCNC: 13.4 G/DL
IMM GRANULOCYTES NFR BLD AUTO: 0.2 %
IRON SATN MFR SERPL: 8 %
IRON SERPL-MCNC: 40 UG/DL
KETONES URINE: NEGATIVE MG/DL
LDLC SERPL-MCNC: 41 MG/DL
LEUKOCYTE ESTERASE URINE: NEGATIVE
LYMPHOCYTES # BLD AUTO: 1.32 K/UL
LYMPHOCYTES NFR BLD AUTO: 25.9 %
MAN DIFF?: NORMAL
MCHC RBC-ENTMCNC: 29.2 PG
MCHC RBC-ENTMCNC: 33.2 G/DL
MCV RBC AUTO: 88 FL
MICROALBUMIN 24H UR DL<=1MG/L-MCNC: 1.9 MG/DL
MICROALBUMIN/CREAT 24H UR-RTO: 55 MG/G
MONOCYTES # BLD AUTO: 0.62 K/UL
MONOCYTES NFR BLD AUTO: 12.2 %
NEUTROPHILS # BLD AUTO: 2.76 K/UL
NEUTROPHILS NFR BLD AUTO: 54 %
NITRITE URINE: NEGATIVE
NONHDLC SERPL-MCNC: 52 MG/DL
PH URINE: 7.5
PLATELET # BLD AUTO: 107 K/UL
POTASSIUM SERPL-SCNC: 4.5 MMOL/L
PROT SERPL-MCNC: 6.9 G/DL
PROTEIN URINE: NEGATIVE MG/DL
RBC # BLD: 4.59 M/UL
RBC # FLD: 15.2 %
SODIUM ?TM SUB UR QN: 31 MMOL/L
SODIUM SERPL-SCNC: 130 MMOL/L
SPECIFIC GRAVITY URINE: 1.01
T4 FREE SERPL-MCNC: 1.4 NG/DL
T4 SERPL-MCNC: 8.4 UG/DL
TIBC SERPL-MCNC: 476 UG/DL
TRIGL SERPL-MCNC: 47 MG/DL
TSH SERPL-ACNC: 3.69 UIU/ML
UIBC SERPL-MCNC: 436 UG/DL
UROBILINOGEN URINE: 1 MG/DL
VIT B12 SERPL-MCNC: 657 PG/ML
WBC # FLD AUTO: 5.1 K/UL

## 2025-06-30 ENCOUNTER — RX RENEWAL (OUTPATIENT)
Age: 65
End: 2025-06-30

## 2025-07-04 LAB — VASOPRESSIN SERPL-MCNC: <0.8 PG/ML
